# Patient Record
Sex: MALE | Race: WHITE
[De-identification: names, ages, dates, MRNs, and addresses within clinical notes are randomized per-mention and may not be internally consistent; named-entity substitution may affect disease eponyms.]

---

## 2017-08-31 ENCOUNTER — HOSPITAL ENCOUNTER (EMERGENCY)
Dept: HOSPITAL 11 - JP.ED | Age: 57
Discharge: HOME | End: 2017-08-31
Payer: MEDICAID

## 2017-08-31 VITALS — SYSTOLIC BLOOD PRESSURE: 137 MMHG | DIASTOLIC BLOOD PRESSURE: 92 MMHG

## 2017-08-31 DIAGNOSIS — Z79.82: ICD-10-CM

## 2017-08-31 DIAGNOSIS — Z98.890: ICD-10-CM

## 2017-08-31 DIAGNOSIS — Z79.84: ICD-10-CM

## 2017-08-31 DIAGNOSIS — Z95.1: ICD-10-CM

## 2017-08-31 DIAGNOSIS — Z95.5: ICD-10-CM

## 2017-08-31 DIAGNOSIS — Z96.659: ICD-10-CM

## 2017-08-31 DIAGNOSIS — Z79.899: ICD-10-CM

## 2017-08-31 DIAGNOSIS — Z79.4: ICD-10-CM

## 2017-08-31 DIAGNOSIS — Z98.52: ICD-10-CM

## 2017-08-31 DIAGNOSIS — R07.89: Primary | ICD-10-CM

## 2017-08-31 DIAGNOSIS — Z96.22: ICD-10-CM

## 2017-08-31 DIAGNOSIS — Z88.2: ICD-10-CM

## 2017-08-31 DIAGNOSIS — E11.65: ICD-10-CM

## 2017-08-31 DIAGNOSIS — I25.10: ICD-10-CM

## 2017-08-31 DIAGNOSIS — Z88.5: ICD-10-CM

## 2017-08-31 PROCEDURE — 99285 EMERGENCY DEPT VISIT HI MDM: CPT

## 2017-08-31 PROCEDURE — 84484 ASSAY OF TROPONIN QUANT: CPT

## 2017-08-31 PROCEDURE — 85025 COMPLETE CBC W/AUTO DIFF WBC: CPT

## 2017-08-31 PROCEDURE — 80048 BASIC METABOLIC PNL TOTAL CA: CPT

## 2017-08-31 PROCEDURE — 36415 COLL VENOUS BLD VENIPUNCTURE: CPT

## 2017-08-31 PROCEDURE — 93005 ELECTROCARDIOGRAM TRACING: CPT

## 2017-08-31 NOTE — EDM.PDOC
ED HPI GENERAL MEDICAL PROBLEM





- General


Chief Complaint: Chest Pain


Stated Complaint: CHEST PAIN DIFFICU;T TIME BREATHING


Time Seen by Provider: 08/31/17 10:00


Source of Information: Reports: Patient


History Limitations: Reports: No Limitations





- History of Present Illness


INITIAL COMMENTS - FREE TEXT/NARRATIVE: 





57-year-old male with previous coronary artery disease, stent placement and 

triple bypass who over the last 2 hours has had intermittent brief chest pains 

lasting 10-12 seconds. No shortness of breath or diaphoresis. He came in "just 

to make sure". 


Onset: Today


Duration: Hour(s): (For the last 2-1/2 hours)


Location: Reports: Chest


Quality: Reports: Sharp


Severity: Mild


Associated Symptoms: Reports: No Other Symptoms


  ** Chest


Pain Score (Numeric/FACES): 7





- Related Data


 Allergies











Allergy/AdvReac Type Severity Reaction Status Date / Time


 


morphine AdvReac  Nausea and Verified 12/08/16 07:56





   Vomiting  


 


Sulfa (Sulfonamide AdvReac  Nausea and Verified 12/08/16 12:34





Antibiotics)   Vomiting  











Home Meds: 


 Home Meds





Aspirin [Halfprin] 81 mg PO DAILY 03/10/15 [History]


Gabapentin [Neurontin] 600 mg PO QID 03/10/15 [History]


Insulin Aspart [NovoLOG] 1 unit SQ TIDMEALS 03/10/15 [History]


Insulin Glarg,Human.Rec.Analog [Lantus] 20 unit SQ BID 03/10/15 [History]


metFORMIN [Glucophage] 1,000 mg PO BIDMEALS 12/07/16 [History]


Acetaminophen/HYDROcodone [Norco 325-5 MG] 1 - 2 tab PO Q4H PRN #50 tablet 12/09 /16 [Rx]











Past Medical History


Cardiovascular History: Reports: Other (See Below)


Other Cardiovascular History: CABG X3 IN 2015


Musculoskeletal History: Reports: Back Pain, Chronic


Psychiatric History: Reports: Bipolar


Endocrine/Metabolic History: Reports: Diabetes, Type II, IDDM





- Infectious Disease History


Infectious Disease History: Reports: Chicken Pox





- Past Surgical History


HEENT Surgical History: Reports: Adenoidectomy, Myringotomy w Tube(s)


Cardiovascular Surgical History: Reports: Coronary Artery Bypass, Coronary 

Artery Stent


Male  Surgical History: Reports: Circumcision, Vasectomy


Neurological Surgical History: Reports: Lumbar Spine


Musculoskeletal Surgical History: Reports: Arthroscopic Knee, Knee Replacement, 

Shoulder Surgery, Other (See Below)





Social & Family History





- Tobacco Use


Smoking Status *Q: Never Smoker


Second Hand Smoke Exposure: No





- Caffeine Use


Caffeine Use: Reports: None





- Alcohol Use


Days Per Week of Alcohol Use: 0





- Recreational Drug Use


Recreational Drug Use: No





ED ROS GENERAL





- Review of Systems


Review Of Systems: See Below


Constitutional: Reports: No Symptoms


HEENT: Reports: No Symptoms


Respiratory: Denies: Shortness of Breath, Pleuritic Chest Pain


Cardiovascular: Reports: Chest Pain.  Denies: Blood Pressure Problem, 

Palpitations


GI/Abdominal: Denies: Abdominal Pain, Nausea, Vomiting


Skin: Denies: Diaphoresis


Neurological: Reports: No Symptoms


Psychiatric: Reports: No Symptoms





ED EXAM, GENERAL





- Physical Exam


Exam: See Below


Exam Limited By: No Limitations


General Appearance: Alert, No Apparent Distress


Eye Exam: Bilateral Eye: Normal Inspection


Head: Atraumatic


Respiratory/Chest: No Respiratory Distress


Cardiovascular: Regular Rate, Rhythm, Extra Beats (Occasional)


GI/Abdominal: Soft, Non-Tender


Extremities: Normal Inspection.  No: Pedal Edema


Neurological: Alert, Oriented


Psychiatric: Normal Affect, Normal Mood


Skin Exam: Warm, Dry





Course





- Vital Signs


Last Recorded V/S: 


 Last Vital Signs











Temp  97.5 F   08/31/17 10:35


 


Pulse  90   08/31/17 10:35


 


Resp  12   08/31/17 10:35


 


BP  137/92 H  08/31/17 10:35


 


Pulse Ox  96   08/31/17 10:35














- Orders/Labs/Meds


Orders: 


 Active Orders 24 hr











 Category Date Time Status


 


 EKG Documentation Completion [RC] ASDIRECTED Care  08/31/17 10:03 Active


 


 EKG 12 Lead [EK] Routine Ther  08/31/17 10:03 Ordered











Labs: 


 Laboratory Tests











  08/31/17 08/31/17 Range/Units





  10:16 10:16 


 


WBC  7.2   (4.5-11.0)  K/uL


 


RBC  5.58   (4.30-5.90)  M/uL


 


Hgb  15.1 H   (12.0-15.0)  g/dL


 


Hct  43.5   (40.0-54.0)  %


 


MCV  78 L   (80-98)  fL


 


MCH  27   (27-31)  pg


 


MCHC  35   (32-36)  %


 


Plt Count  247   (150-400)  K/uL


 


Neut % (Auto)  63   (36-66)  %


 


Lymph % (Auto)  25   (24-44)  %


 


Mono % (Auto)  10 H   (2-6)  %


 


Eos % (Auto)  2   (2-4)  %


 


Baso % (Auto)  0   (0-1)  %


 


Sodium   137 L  (140-148)  mmol/L


 


Potassium   4.2  (3.6-5.2)  mmol/L


 


Chloride   104  (100-108)  mmol/L


 


Carbon Dioxide   27  (21-32)  mmol/L


 


Anion Gap   10.2  (5.0-14.0)  mmol/L


 


BUN   16  (7-18)  mg/dL


 


Creatinine   1.0  (0.8-1.3)  mg/dL


 


Est Cr Clr Drug Dosing   78.85  mL/min


 


Estimated GFR (MDRD)   > 60  (>60)  


 


Glucose   372 H  ()  mg/dL


 


Calcium   8.2 L  (8.5-10.1)  mg/dL


 


Troponin I   < 0.017  (0.000-0.056)  ng/mL











Meds: 


Medications














Discontinued Medications














Generic Name Dose Route Start Last Admin





  Trade Name Raul  PRN Reason Stop Dose Admin


 


Nitroglycerin  0.4 mg  08/31/17 10:28  08/31/17 10:33





  Nitrostat  SL  08/31/17 10:29  0.4 mg





  ONETIME ONE   Administration














- Re-Assessments/Exams


Free Text/Narrative Re-Assessment/Exam: 





08/31/17 10:30


EKG showed no acute findings, sinus rhythm. CBC, BMP and troponin were obtained

, a sublingual nitroglycerin was given to see if it would relieve esophageal 

spasm which is more typical of the types of pain he is experiencing. His vital 

signs remained stable.


08/31/17 10:51


Nitroglycerin had no effect, he was still experiencing very brief discomfort 

every 2-3 minutes for about 10 seconds. I offered a GI cocktail but when his 

troponin came back negative and he was reassured it wasn't cardiac pain he 

wanted to just give it a day or 2 to see if it will go away.





Departure





- Departure


Time of Disposition: 11:10


Disposition: Home, Self-Care 01


Condition: Good


Clinical Impression: 


 Atypical chest pain, Hyperglycemia





Coronary artery disease


Qualifiers:


 Coronary Disease-Associated Artery/Lesion type: unspecified vessel or lesion 

type Native vs. transplanted heart: native heart Associated angina: without 

angina Qualified Code(s): I25.10 - Atherosclerotic heart disease of native 

coronary artery without angina pectoris








- Discharge Information


Instructions:  Nonspecific Chest Pain, Easy-to-Read


Referrals: 


PCP,None [Primary Care Provider] - 


Forms:  ED Department Discharge


Care Plan Goals: 


Continue your regular medications and increase activity as tolerated. Return 

any time if pain worsens or you develop other concerns.





- My Orders


Last 24 Hours: 


My Active Orders





08/31/17 10:03


EKG Documentation Completion [RC] ASDIRECTED 


EKG 12 Lead [EK] Routine 














- Assessment/Plan


Last 24 Hours: 


My Active Orders





08/31/17 10:03


EKG Documentation Completion [RC] ASDIRECTED 


EKG 12 Lead [EK] Routine

## 2017-10-25 ENCOUNTER — HOSPITAL ENCOUNTER (EMERGENCY)
Dept: HOSPITAL 11 - JP.ED | Age: 57
Discharge: NURSING, REHAB OR LONG-TERM CARE | End: 2017-10-25
Payer: MEDICAID

## 2017-10-25 VITALS — SYSTOLIC BLOOD PRESSURE: 129 MMHG | DIASTOLIC BLOOD PRESSURE: 97 MMHG

## 2017-10-25 DIAGNOSIS — Z88.5: ICD-10-CM

## 2017-10-25 DIAGNOSIS — Z79.82: ICD-10-CM

## 2017-10-25 DIAGNOSIS — E11.9: ICD-10-CM

## 2017-10-25 DIAGNOSIS — I21.4: Primary | ICD-10-CM

## 2017-10-25 DIAGNOSIS — Z88.2: ICD-10-CM

## 2017-10-25 DIAGNOSIS — Z79.899: ICD-10-CM

## 2017-10-25 DIAGNOSIS — Z79.4: ICD-10-CM

## 2017-10-25 PROCEDURE — 99285 EMERGENCY DEPT VISIT HI MDM: CPT

## 2017-10-25 PROCEDURE — 96375 TX/PRO/DX INJ NEW DRUG ADDON: CPT

## 2017-10-25 PROCEDURE — 93005 ELECTROCARDIOGRAM TRACING: CPT

## 2017-10-25 PROCEDURE — 96374 THER/PROPH/DIAG INJ IV PUSH: CPT

## 2017-10-25 PROCEDURE — 84484 ASSAY OF TROPONIN QUANT: CPT

## 2017-10-25 PROCEDURE — 82553 CREATINE MB FRACTION: CPT

## 2017-10-25 PROCEDURE — 71010: CPT

## 2017-10-25 PROCEDURE — 85730 THROMBOPLASTIN TIME PARTIAL: CPT

## 2017-10-25 PROCEDURE — 85025 COMPLETE CBC W/AUTO DIFF WBC: CPT

## 2017-10-25 PROCEDURE — 80053 COMPREHEN METABOLIC PANEL: CPT

## 2017-10-25 PROCEDURE — 82962 GLUCOSE BLOOD TEST: CPT

## 2017-10-25 PROCEDURE — 85610 PROTHROMBIN TIME: CPT

## 2017-10-25 PROCEDURE — 96376 TX/PRO/DX INJ SAME DRUG ADON: CPT

## 2017-10-25 PROCEDURE — 36415 COLL VENOUS BLD VENIPUNCTURE: CPT

## 2017-10-25 NOTE — EDM.PDOC
ED HPI GENERAL MEDICAL PROBLEM





- General


Chief Complaint: Chest Pain


Stated Complaint: CHEST PAIN


Time Seen by Provider: 10/25/17 12:58


Source of Information: Reports: Patient, Family, RN Notes Reviewed


History Limitations: Reports: No Limitations





- History of Present Illness


INITIAL COMMENTS - FREE TEXT/NARRATIVE: 





57-year-old gentleman presents to the emergency department day complaint of 

chest pain, he has a known history of coronary artery disease states the chest 

pain started 45 minutes prior it feels worse than his prior heart attack he is 

nauseated the pain is radiating down his left arm and he does feel short of 

breath he is not diaphoretic


  ** Chest


Pain Score (Numeric/FACES): 3





- Related Data


 Allergies











Allergy/AdvReac Type Severity Reaction Status Date / Time


 


morphine AdvReac  Nausea and Verified 10/25/17 14:38





   Vomiting  


 


Sulfa (Sulfonamide AdvReac  Nausea and Verified 10/25/17 14:38





Antibiotics)   Vomiting  











Home Meds: 


 Home Meds





Aspirin [Halfprin] 81 mg PO DAILY 03/10/15 [History]


Gabapentin [Neurontin] 600 mg PO QID 03/10/15 [History]


Insulin Aspart [NovoLOG] 1 unit SQ TIDMEALS 03/10/15 [History]


Insulin Glarg,Human.Rec.Analog [Lantus] 30 unit SQ BID 03/10/15 [History]


metFORMIN [Glucophage] 1,000 mg PO BIDMEALS 12/07/16 [History]











Past Medical History


Cardiovascular History: Reports: CAD, High Cholesterol, Hypertension, MI, Other 

(See Below)


Other Cardiovascular History: CABG X3 IN 2015


Musculoskeletal History: Reports: Back Pain, Chronic


Psychiatric History: Reports: Bipolar


Endocrine/Metabolic History: Reports: Diabetes, Type II, IDDM





- Infectious Disease History


Infectious Disease History: Reports: Chicken Pox, Mumps





- Past Surgical History


HEENT Surgical History: Reports: Adenoidectomy, Myringotomy w Tube(s)


Cardiovascular Surgical History: Reports: Coronary Artery Bypass, Coronary 

Artery Stent


GI Surgical History: Reports: Cholecystectomy


Male  Surgical History: Reports: Circumcision, Vasectomy


Neurological Surgical History: Reports: Lumbar Spine


Musculoskeletal Surgical History: Reports: Arthroscopic Knee, Knee Replacement, 

Shoulder Surgery, Other (See Below)


Other Musculoskeletal Surgeries/Procedures:: back surgery, multiple  left leg 

surgery, left and right shoulder surgerys





Social & Family History





- Family History


Cardiac: Reports: Heart Failure, MI


Neurological: Reports: None


Oncologic: Reports: Pancreatic





- Tobacco Use


Smoking Status *Q: Never Smoker


Second Hand Smoke Exposure: No





- Caffeine Use


Caffeine Use: Reports: Soda, Tea


Other Caffeine Use: diet





- Alcohol Use


Days Per Week of Alcohol Use: 0





- Recreational Drug Use


Recreational Drug Use: No





ED ROS GENERAL





- Review of Systems


Review Of Systems: See Below


Constitutional: Reports: No Symptoms


HEENT: Reports: No Symptoms


Respiratory: Reports: Shortness of Breath


Cardiovascular: Reports: Chest Pain


GI/Abdominal: Reports: Nausea.  Denies: Vomiting


: Reports: No Symptoms


Musculoskeletal: Reports: No Symptoms


Skin: Reports: No Symptoms


Neurological: Reports: No Symptoms





ED EXAM, GENERAL





- Physical Exam


Exam: See Below


Exam Limited By: No Limitations


General Appearance: Alert, Moderate Distress


Head: Atraumatic, Normocephalic


Neck: Normal Inspection, Supple, Non-Tender, Full Range of Motion


Respiratory/Chest: No Respiratory Distress, Lungs Clear, Normal Breath Sounds, 

No Accessory Muscle Use


Cardiovascular: No Murmur, Tachycardia


GI/Abdominal: Soft, Non-Tender





Course





- Vital Signs


Last Recorded V/S: 


 Last Vital Signs











Temp  99.0 F   10/25/17 13:33


 


Pulse  111 H  10/25/17 13:33


 


Resp  16   10/25/17 15:44


 


BP  129/80   10/25/17 15:44


 


Pulse Ox  99   10/25/17 15:44














- Orders/Labs/Meds


Orders: 


 Active Orders 24 hr











 Category Date Time Status


 


 Cardiac Monitoring [RC] .As Directed Care  10/25/17 13:02 Active


 


 EKG Documentation Completion [RC] ASDIRECTED Care  10/25/17 13:03 Active


 


 Peripheral IV Care [RC] .AS DIRECTED Care  10/25/17 13:03 Active


 


 Peripheral IV Care [RC] .AS DIRECTED Care  10/25/17 16:04 Ordered


 


 GLUCOSE POC LAB TO COLLECT [POC] Stat Lab  10/25/17 17:00 Ordered


 


 Heparin Sodium/D5W [Heparin 25,000 Units in D5W 500 ML] Med  10/25/17 16:15 

Ordered





 25,000 units in 500 ml IV TITRATE   


 


 Metoprolol Tartrate [Lopressor] Med  10/25/17 16:18 Once





 25 mg PO ONETIME ONE   


 


 Nitroglycerin/D5W [Nitroglycerin  25 MG/D5W 250 ML] Med  10/25/17 15:00 Active





 25 mg in 250 ml IV TITRATE   


 


 Sodium Chloride 0.9% [Saline Flush] Med  10/25/17 13:02 Active





 10 ml FLUSH ASDIRECTED PRN   


 


 Sodium Chloride 0.9% [Saline Flush] Med  10/25/17 16:03 Ordered





 10 ml FLUSH ASDIRECTED PRN   


 


 Peripheral IV Insertion Adult [OM.PC] Stat Oth  10/25/17 13:02 Ordered


 


 Peripheral IV Insertion Adult [OM.PC] Stat Oth  10/25/17 16:03 Ordered


 


 Saline Lock Insert [OM.PC] Stat Oth  10/25/17 13:02 Ordered


 


 EKG 12 Lead [EK] Stat Ther  10/25/17 13:03 Ordered








 Medication Orders





Nitroglycerin/Dextrose (Nitroglycerin  25 Mg/D5w 250 Ml)  25 mg in 250 mls @ 

1.2 mls/hr IV TITRATE JOAN; 2 MCG/MIN


   PRN Reason: Protocol


   Last Admin: 10/25/17 15:41  Dose: 2 mcg/min, 1.2 mls/hr


Heparin Sodium/Dextrose (Heparin 25,000 Units In D5w 500 Ml)  25,000 units in 

500 mls @ 0 mls/hr IV TITRATE JOAN; 12 UNITS/KG/HR


   PRN Reason: Protocol


Sodium Chloride (Saline Flush)  10 ml FLUSH ASDIRECTED PRN


   PRN Reason: Keep Vein Open


   Last Admin: 10/25/17 14:11  Dose: 10 ml


Sodium Chloride (Saline Flush)  10 ml FLUSH ASDIRECTED PRN


   PRN Reason: Keep Vein Open








Labs: 


 Laboratory Tests











  10/25/17 10/25/17 10/25/17 Range/Units





  13:12 13:12 13:12 


 


WBC  11.5 H    (4.5-11.0)  K/uL


 


RBC  6.02 H    (4.30-5.90)  M/uL


 


Hgb  16.4 H    (12.0-15.0)  g/dL


 


Hct  46.4    (40.0-54.0)  %


 


MCV  77 L    (80-98)  fL


 


MCH  27    (27-31)  pg


 


MCHC  35    (32-36)  %


 


Plt Count  299    (150-400)  K/uL


 


Neut % (Auto)  76 H    (36-66)  %


 


Lymph % (Auto)  17 L    (24-44)  %


 


Mono % (Auto)  6    (2-6)  %


 


Eos % (Auto)  1 L    (2-4)  %


 


Baso % (Auto)  0    (0-1)  %


 


PT   11.2   (9.5-12.0)  sec


 


INR   1.04   (0.80-1.20)  


 


APTT   25.4 L   (27.0-36.0)  sec


 


Sodium    135 L  (140-148)  mmol/L


 


Potassium    3.8  (3.6-5.2)  mmol/L


 


Chloride    103  (100-108)  mmol/L


 


Carbon Dioxide    20 L  (21-32)  mmol/L


 


Anion Gap    15.8 H  (5.0-14.0)  mmol/L


 


BUN    17  (7-18)  mg/dL


 


Creatinine    1.2  (0.8-1.3)  mg/dL


 


Est Cr Clr Drug Dosing    65.71  mL/min


 


Estimated GFR (MDRD)    > 60  (>60)  


 


Glucose    483 H*  ()  mg/dL


 


Calcium    9.5  D  (8.5-10.1)  mg/dL


 


Total Bilirubin    0.9  D  (0.2-1.0)  mg/dL


 


AST    86 H  (15-37)  U/L


 


ALT    134 H  (12-78)  U/L


 


Alkaline Phosphatase    153 H  ()  U/L


 


CK-MB (CK-2)    2.2  (0-3.6)  mg/mL


 


Troponin I    0.031  (0.000-0.056)  ng/mL


 


Total Protein    7.1  (6.4-8.2)  g/dL


 


Albumin    3.3 L  (3.4-5.0)  g/dL


 


Globulin    3.8 H  (2.3-3.5)  g/dL


 


Albumin/Globulin Ratio    0.9 L  (1.2-2.2)  














  10/25/17 Range/Units





  15:20 


 


WBC   (4.5-11.0)  K/uL


 


RBC   (4.30-5.90)  M/uL


 


Hgb   (12.0-15.0)  g/dL


 


Hct   (40.0-54.0)  %


 


MCV   (80-98)  fL


 


MCH   (27-31)  pg


 


MCHC   (32-36)  %


 


Plt Count   (150-400)  K/uL


 


Neut % (Auto)   (36-66)  %


 


Lymph % (Auto)   (24-44)  %


 


Mono % (Auto)   (2-6)  %


 


Eos % (Auto)   (2-4)  %


 


Baso % (Auto)   (0-1)  %


 


PT   (9.5-12.0)  sec


 


INR   (0.80-1.20)  


 


APTT   (27.0-36.0)  sec


 


Sodium   (140-148)  mmol/L


 


Potassium   (3.6-5.2)  mmol/L


 


Chloride   (100-108)  mmol/L


 


Carbon Dioxide   (21-32)  mmol/L


 


Anion Gap   (5.0-14.0)  mmol/L


 


BUN   (7-18)  mg/dL


 


Creatinine   (0.8-1.3)  mg/dL


 


Est Cr Clr Drug Dosing   mL/min


 


Estimated GFR (MDRD)   (>60)  


 


Glucose   ()  mg/dL


 


Calcium   (8.5-10.1)  mg/dL


 


Total Bilirubin   (0.2-1.0)  mg/dL


 


AST   (15-37)  U/L


 


ALT   (12-78)  U/L


 


Alkaline Phosphatase   ()  U/L


 


CK-MB (CK-2)   (0-3.6)  mg/mL


 


Troponin I  1.449 H*  (0.000-0.056)  ng/mL


 


Total Protein   (6.4-8.2)  g/dL


 


Albumin   (3.4-5.0)  g/dL


 


Globulin   (2.3-3.5)  g/dL


 


Albumin/Globulin Ratio   (1.2-2.2)  











Meds: 


Medications











Generic Name Dose Route Start Last Admin





  Trade Name Freq  PRN Reason Stop Dose Admin


 


Nitroglycerin/Dextrose  25 mg in 250 mls @ 1.2 mls/hr  10/25/17 15:00  10/25/17 

15:41





  Nitroglycerin  25 Mg/D5w 250 Ml  IV   2 mcg/min





  TITRATE JOAN   1.2 mls/hr





  Protocol   Administration





  2 MCG/MIN   


 


Heparin Sodium/Dextrose  25,000 units in 500 mls @ 0 mls/hr  10/25/17 16:15  





  Heparin 25,000 Units In D5w 500 Ml  IV   





  TITRATE JOAN   





  Protocol   





  12 UNITS/KG/HR   


 


Sodium Chloride  10 ml  10/25/17 13:02  10/25/17 14:11





  Saline Flush  FLUSH   10 ml





  ASDIRECTED PRN   Administration





  Keep Vein Open   


 


Sodium Chloride  10 ml  10/25/17 16:03  





  Saline Flush  FLUSH   





  ASDIRECTED PRN   





  Keep Vein Open   














Discontinued Medications














Generic Name Dose Route Start Last Admin





  Trade Name Rafaelq  PRN Reason Stop Dose Admin


 


Aspirin  324 mg  10/25/17 13:02  10/25/17 13:21





  Aspirin  PO  10/25/17 13:03  324 mg





  ONETIME ONE   Administration


 


Clopidogrel Bisulfate  300 mg  10/25/17 16:03  





  Plavix  PO  10/25/17 16:04  





  ONETIME ONE   


 


Fentanyl  100 mcg  10/25/17 13:03  10/25/17 13:21





  Sublimaze  IVPUSH  10/25/17 13:04  100 mcg





  ONETIME ONE   Administration


 


Fentanyl  50 mcg  10/25/17 13:55  10/25/17 14:07





  Sublimaze  IVPUSH  10/25/17 13:56  50 mcg





  ONETIME ONE   Administration


 


Heparin Sodium (Porcine)  4,000 units  10/25/17 16:03  





  Heparin Sodium  IVPUSH  10/25/17 16:04  





  .BOLUS ONE   


 


Insulin Aspart  10 unit  10/25/17 14:35  





  Novolog  SUBCUT  10/25/17 14:36  





  NOW STA   


 


Insulin Aspart  30 unit  10/25/17 15:49  





  Novolog  SUBCUT  10/25/17 15:50  





  NOW STA   


 


Insulin Aspart  Confirm  10/25/17 16:15  





  Novolog  Administered  10/25/17 16:16  





  Dose   





  1,000 unit   





  .ROUTE   





  .STK-MED ONE   


 


Nitroglycerin  0.4 mg  10/25/17 14:25  10/25/17 14:30





  Nitrostat  SL  10/25/17 14:26  0.4 mg





  ONETIME ONE   Administration


 


Ondansetron HCl  4 mg  10/25/17 13:03  10/25/17 13:23





  Zofran  IVPUSH  10/25/17 13:04  4 mg





  ONETIME ONE   Administration














Departure





- Departure


Time of Disposition: 16:22


Disposition: DC/[facility] to [facility]


Reason for Transfer *Q: Primary PCI Indicated


Condition: Fair


Clinical Impression: 


 Non-STEMI (non-ST elevated myocardial infarction)





Referrals: 


Jani Crandall MD [Primary Care Provider] - 


Forms:  ED Department Discharge





- My Orders


Last 24 Hours: 


My Active Orders





10/25/17 13:02


Cardiac Monitoring [RC] .As Directed 


Sodium Chloride 0.9% [Saline Flush]   10 ml FLUSH ASDIRECTED PRN 


Peripheral IV Insertion Adult [OM.PC] Stat 


Saline Lock Insert [OM.PC] Stat 





10/25/17 13:03


EKG Documentation Completion [RC] ASDIRECTED 


Peripheral IV Care [RC] .AS DIRECTED 


EKG 12 Lead [EK] Stat 





10/25/17 15:00


Nitroglycerin/D5W [Nitroglycerin  25 MG/D5W 250 ML] 25 mg in 250 ml IV TITRATE 





10/25/17 16:03


Sodium Chloride 0.9% [Saline Flush]   10 ml FLUSH ASDIRECTED PRN 


Peripheral IV Insertion Adult [OM.PC] Stat 





10/25/17 16:04


Peripheral IV Care [RC] .AS DIRECTED 





10/25/17 16:15


Heparin Sodium/D5W [Heparin 25,000 Units in D5W 500 ML] 25,000 units in 500 ml 

IV TITRATE 





10/25/17 16:18


Metoprolol Tartrate [Lopressor]   25 mg PO ONETIME ONE 





10/25/17 17:00


GLUCOSE POC LAB TO COLLECT [POC] Stat 














- Assessment/Plan


Last 24 Hours: 


My Active Orders





10/25/17 13:02


Cardiac Monitoring [RC] .As Directed 


Sodium Chloride 0.9% [Saline Flush]   10 ml FLUSH ASDIRECTED PRN 


Peripheral IV Insertion Adult [OM.PC] Stat 


Saline Lock Insert [OM.PC] Stat 





10/25/17 13:03


EKG Documentation Completion [RC] ASDIRECTED 


Peripheral IV Care [RC] .AS DIRECTED 


EKG 12 Lead [EK] Stat 





10/25/17 15:00


Nitroglycerin/D5W [Nitroglycerin  25 MG/D5W 250 ML] 25 mg in 250 ml IV TITRATE 





10/25/17 16:03


Sodium Chloride 0.9% [Saline Flush]   10 ml FLUSH ASDIRECTED PRN 


Peripheral IV Insertion Adult [OM.PC] Stat 





10/25/17 16:04


Peripheral IV Care [RC] .AS DIRECTED 





10/25/17 16:15


Heparin Sodium/D5W [Heparin 25,000 Units in D5W 500 ML] 25,000 units in 500 ml 

IV TITRATE 





10/25/17 16:18


Metoprolol Tartrate [Lopressor]   25 mg PO ONETIME ONE 





10/25/17 17:00


GLUCOSE POC LAB TO COLLECT [POC] Stat 











Plan: 





Assessment





Acuity = acute





Site and laterality = non-ST elevation myocardial infarction complicated 

patient with known history coronary artery disease, hypertension, dyslipidemia 

and diabetes mellitus type 2    





Etiology  = probably related coronary artery disease





Manifestations = chest pain no stable





Location of injury =  Home





Lab values = WBC elevated 11.5 consistent with leukocytosis, hemoglobin 

elevated 16.9 sodium low at 135 consistent hyponatremia glucose elevated at 483 

consistent with hyperglycemia AST elevated 86 ALTs elevated 153 consistent with 

elevated liver enzymes initial troponin was 0.0312 hours later 1.449 albumin 

low at 3.3 consistent hypoalbuminemia EKG demonstrates sinus rhythm I don't 

appreciate any ST elevations or depressions, chest x-ray shows no acute process





Plan


Called and discussed case with Dr. Whiteside hospitalist on call at [facility] kindly accept the patient in transport he will be transported via 

EMS ground he has received aspirin, 3 sprays of nitroglycerin followed by a 

nitro drip 150 g of fentanyl he is pain-free at this time 25 mg of metoprolol 

300 mg of Plavix 4000 and unit bolus of heparin and initiated heparin drip upon 

discharge from the ED

















Patient was in agreement with the plan all questions were answered, This note 

was dictated using dragon voice recognition software please call with any 

questions.

## 2017-10-25 NOTE — CR
Chest 1V Frontal

 

FINDINGS: There is elevation of the right hemidiaphragm. Intact sternal wires are demonstrated. The h
eart and vascular structures are normal in appearance. No infiltrates or effusions are demonstrated. 
The skeletal structures are unremarkable.

 

IMPRESSION:

1. No acute findings.

## 2018-04-17 ENCOUNTER — HOSPITAL ENCOUNTER (INPATIENT)
Dept: HOSPITAL 11 - JP.SDSSCHI | Age: 58
LOS: 2 days | Discharge: HOME | DRG: 460 | End: 2018-04-19
Attending: ORTHOPAEDIC SURGERY | Admitting: ORTHOPAEDIC SURGERY
Payer: MEDICAID

## 2018-04-17 DIAGNOSIS — M99.83: ICD-10-CM

## 2018-04-17 DIAGNOSIS — Z79.82: ICD-10-CM

## 2018-04-17 DIAGNOSIS — Z79.01: ICD-10-CM

## 2018-04-17 DIAGNOSIS — M51.26: Primary | ICD-10-CM

## 2018-04-17 DIAGNOSIS — Z95.5: ICD-10-CM

## 2018-04-17 DIAGNOSIS — Z88.5: ICD-10-CM

## 2018-04-17 DIAGNOSIS — I25.2: ICD-10-CM

## 2018-04-17 DIAGNOSIS — Z79.4: ICD-10-CM

## 2018-04-17 DIAGNOSIS — E11.9: ICD-10-CM

## 2018-04-17 DIAGNOSIS — I25.10: ICD-10-CM

## 2018-04-17 DIAGNOSIS — M48.061: ICD-10-CM

## 2018-04-17 DIAGNOSIS — Z88.2: ICD-10-CM

## 2018-04-17 DIAGNOSIS — G89.29: ICD-10-CM

## 2018-04-17 DIAGNOSIS — Z95.1: ICD-10-CM

## 2018-04-17 DIAGNOSIS — M54.9: ICD-10-CM

## 2018-04-17 DIAGNOSIS — Z96.659: ICD-10-CM

## 2018-04-17 DIAGNOSIS — I10: ICD-10-CM

## 2018-04-17 PROCEDURE — C1713 ANCHOR/SCREW BN/BN,TIS/BN: HCPCS

## 2018-04-17 PROCEDURE — C9113 INJ PANTOPRAZOLE SODIUM, VIA: HCPCS

## 2018-04-17 PROCEDURE — 0ST20ZZ RESECTION OF LUMBAR VERTEBRAL DISC, OPEN APPROACH: ICD-10-PCS | Performed by: ORTHOPAEDIC SURGERY

## 2018-04-17 PROCEDURE — 0SG10A0 FUSION OF 2 OR MORE LUMBAR VERTEBRAL JOINTS WITH INTERBODY FUSION DEVICE, ANTERIOR APPROACH, ANTERIOR COLUMN, OPEN APPROACH: ICD-10-PCS | Performed by: ORTHOPAEDIC SURGERY

## 2018-04-17 RX ADMIN — INSULIN DETEMIR SCH UNITS: 100 INJECTION, SOLUTION SUBCUTANEOUS at 21:51

## 2018-04-17 RX ADMIN — Medication SCH: at 18:42

## 2018-04-17 NOTE — OR
DATE OF PROCEDURE:  04/17/2018

 

PREOPERATIVE DIAGNOSIS:  Lumbar stenosis, L4-L5 and L5-S1.

 

POSTOPERATIVE DIAGNOSIS:  Lumbar stenosis, L4-L5 and L5-S1.

 

PROCEDURE:

1. Anterior lumbar interbody fusion, L4-L5 and L5-S1.

2. Interbody placement at L4-L5 and L5-S1.

3. Segmental instrumentation anteriorly at L4-L5 and L5-S1.

 

CO-SURGEON:  Henrry Suazo MD.

 

ANESTHESIA:  General endotracheal intubation.

 

FLUIDS:  Lactated Ringer's solution.

 

ESTIMATED BLOOD LOSS:  250 mL.

 

COMPLICATIONS:  None.

 

SPECIMEN:  None.

 

DISCHARGE DISPOSITION:  Stable to PACU.

 

HISTORY AND INDICATIONS FOR THE PROCEDURE:  The patient was seen preoperatively in the

clinic.  He failed nonoperative treatment.  Preoperative imaging confirmed the above-

mentioned diagnosis.  Risks and benefits of the procedure were explained to the patient.

Informed consent was obtained.

 

DETAILS OF PROCEDURE:  The patient was seen preoperatively by myself and the Anesthesia

staff in the preoperative holding area, where the operative site was marked.  He was brought

to the operative suite by Anesthesia staff where general anesthesia was administered,

neuromonitoring leads were placed, and normal at baseline.  Sterile Sweeney catheter was

inserted.  The fluoroscopy unit was draped in a sterile manner.  A time-out was called

identifying the correct patient, the correct procedure, the correct site, and antibiotics

were begun within appropriate period of time.  Please see Dr. Henrry Suazo's note for

exposure.

 

After exposure was completed at L5-S1, I then went through the anterior annulus with a Bovie

electrocautery and a deep 15 blade.  I then used a Vann to go along the vertebral endplates

and then removed as much as I could of the disk on block.  I then used curettes,

pituitaries, and Kerrisons to remove the remainder of the disk.  I then sequentially

inserted carlie from 9 mm to 15 mm.  I then inserted a 25 x 31, 15-degree, 15 mm trial and

evaluated this under fluoroscopy.  I did make some minor adjustments to make sure that I

could get midline enough.  I then removed this trial.  I then packed the final implant,

which was a magnify-S from [employer], 25 x 31, 15-degree, 14-17 mm implant.  Then, I had packed

it with Signify allograft from [employer].  We then inserted and then I expanded it under direct

fluoroscopic visualization.  I then awl'd my awl, tapped in place my 30 mm screws, all 3

were 30 mm, hydroxyapatite coated, 5.5 x 30 mm screws.  Dr. Henrry Suazo then exposed the L4-

L5 level and then I repeated the same procedure with the same size trial and the same size

implant with two 30 mm screws superiorly and on the right and then a 25 mm screw on the

left.  I was unable to place a 30 mm screw on the left because it would not go down far

enough.  I then locked all of my screws into place on both implants.  We took final films.

Please see Dr. Henrry Suazo's note for closure.

 

 

 

 

Tahir Suazo DO

DD:  04/17/2018 16:34:55

DT:  04/17/2018 17:16:59

Job #:  [id number]

## 2018-04-18 RX ADMIN — Medication SCH: at 08:28

## 2018-04-18 RX ADMIN — INSULIN DETEMIR SCH UNITS: 100 INJECTION, SOLUTION SUBCUTANEOUS at 21:29

## 2018-04-18 RX ADMIN — INSULIN DETEMIR SCH UNITS: 100 INJECTION, SOLUTION SUBCUTANEOUS at 08:31

## 2018-04-18 NOTE — PCM.SURGPN
- General Info


Date of Service: 04/18/18


Date of Surgery/Procedure: 04/17/18


POD#: 1


Post-Op Diagnosis: Lumbar stenosis L4-L5 L5-S1


Functional Status: Reports: Pain Controlled, Ambulating, Urinating





- Review of Systems


General: Reports: No Symptoms


HEENT: Reports: No Symptoms


Pulmonary: Reports: No Symptoms


Cardiovascular: Reports: No Symptoms


Gastrointestinal: Reports: Abdominal Pain (pain at incision site)


Genitourinary: Reports: No Symptoms


Musculoskeletal: Reports: No Symptoms


Systems Review Comment:: 





Pt states he is doing very well, no back pain although he does have some pain 

at the incision site on abdomen. Pt states he is hungry and would like to eat.





- Patient Data


Vitals - Most Recent: 


 Last Vital Signs











Temp  36.4 C   04/18/18 14:14


 


Pulse  99   04/18/18 14:14


 


Resp  18   04/18/18 14:14


 


BP  141/73 H  04/18/18 14:14


 


Pulse Ox  97   04/18/18 14:14











Weight - Most Recent: 83.915 kg


I&O - Last 24 Hours: 


 Intake & Output











 04/18/18 04/18/18 04/18/18





 06:59 14:59 22:59


 


Intake Total 1712 470 


 


Output Total 1150 350 


 


Balance 562 120 











Med Orders - Current: 


 Current Medications





Aspirin (Halfprin)  81 mg PO DAILY Count includes the [facility]


   Last Admin: 04/18/18 08:27 Dose:  81 mg


Cyclobenzaprine HCl (Flexeril)  10 mg PO Q6H PRN


   PRN Reason: MUSCLE SPASM


Cyclobenzaprine HCl (Flexeril)  10 mg PO TID PRN


   PRN Reason: Muscle Spasm


Dextrose (Glutose 15)  15 gm PO ASDIRECTED PRN


   PRN Reason: HYPOGLYCEMIA


Dextrose/Water (Dextrose 50% In Water)  50 ml IVPUSH ASDIRECTED PRN


   PRN Reason: HYPOGLYCEMIA


Docusate Sodium (Colace)  100 mg PO BID Count includes the [facility]


   Last Admin: 04/18/18 08:27 Dose:  100 mg


Gabapentin (Neurontin)  600 mg PO QID Count includes the [facility]


   Last Admin: 04/18/18 10:25 Dose:  Not Given


Glucagon (Glucagen)  1 mg IM ASDIRECTED PRN


   PRN Reason: HYPOGLYCEMIA


Hydromorphone HCl (Dilaudid Pca 15 Mg In Ns 30 Ml)  0 mg IV ASDIRECTED PRN; 

Protocol


   PRN Reason: Pain


   Last Admin: 04/17/18 10:43 Dose:  0.3 mg


Hydroxyzine HCl (Vistaril)  100 mg IM Q4H PRN


   PRN Reason: PAIN


Hydroxyzine HCl (Atarax)  100 mg PO Q4H PRN


   PRN Reason: PAIN


Dextrose/Lactated Ringer's (Dextrose 5%-Lactated Ringers)  1,000 mls @ 150 mls/

hr IV ASDIRECTED Count includes the [facility]


   Last Admin: 04/18/18 12:38 Dose:  150 mls/hr


Insulin Aspart (Novolog)  0 unit SUBCUT QID PRN; Protocol


   PRN Reason: LOW CORRECTIONAL DOSE


   Last Admin: 04/18/18 11:58 Dose:  4 units


Insulin Detemir (Levemir)  20 unit SUBCUT BID Count includes the [facility]


   Last Admin: 04/18/18 08:31 Dose:  20 units


Lisinopril (Prinivil)  2.5 mg PO DAILY Count includes the [facility]


   Last Admin: 04/18/18 08:34 Dose:  2.5 mg


Naloxone HCl (Narcan)  0.1 mg IV ASDIRECTED PRN


   PRN Reason: decreased respiratory rate


Scopolamine Patch (Check)  1 each TOP DAILY Count includes the [facility]


   Last Admin: 04/18/18 08:28 Dose:  Not Given


Ondansetron HCl (Zofran)  4 mg IVPUSH Q4H PRN


   PRN Reason: NAUSEA


   Last Admin: 04/17/18 23:22 Dose:  4 mg


Oxycodone/Acetaminophen (Percocet 325-5 Mg)  1 - 2 tab PO Q4H PRN


   PRN Reason: paiin


Pantoprazole Sodium (Protonix Iv***)  40 mg IV Q24H Count includes the [facility]


   Last Admin: 04/17/18 18:48 Dose:  40 mg


Scopolamine (Transderm-Scop)  1.5 mg TOP Q72H Count includes the [facility]


   Last Admin: 04/17/18 09:06 Dose:  1.5 mg


Ticagrelor (Brilinta)  90 mg PO BID Count includes the [facility]


   Last Admin: 04/18/18 08:27 Dose:  90 mg





Discontinued Medications





Acetaminophen (Tylenol Extra Strength)  1,000 mg PO ONETIME ONE


   Stop: 04/17/18 08:31


   Last Admin: 04/17/18 09:06 Dose:  1,000 mg


Acetaminophen (Tylenol Extra Strength)  1,000 mg PO Q6H Count includes the [facility]


   Last Admin: 04/18/18 05:54 Dose:  1,000 mg


Aspirin (Halfprin)  81 mg PO DAILY Count includes the [facility]


   Last Admin: 04/18/18 08:28 Dose:  Not Given


Cyclobenzaprine HCl (Flexeril)  10 mg PO Q8H PRN


   PRN Reason: MUSCLE SPASM


Dexamethasone (Dexamethasone) Confirm Administered Dose 4 mg .ROUTE .STK-MED ONE


   Stop: 04/17/18 09:16


Fentanyl (Sublimaze) Confirm Administered Dose 250 mcg .ROUTE .STK-MED ONE


   Stop: 04/17/18 09:16


Fentanyl (Sublimaze) Confirm Administered Dose 250 mcg .ROUTE .STK-MED ONE


   Stop: 04/17/18 13:28


Fentanyl (Sublimaze) Confirm Administered Dose 250 mcg .ROUTE .STK-MED ONE


   Stop: 04/17/18 13:46


Fentanyl (Sublimaze) Confirm Administered Dose 250 mcg .ROUTE .STK-MED ONE


   Stop: 04/17/18 14:02


Fentanyl (Sublimaze) Confirm Administered Dose 250 mcg .ROUTE .STK-MED ONE


   Stop: 04/17/18 15:38


Gabapentin (Neurontin)  300 mg PO ONETIME ONE


   Stop: 04/17/18 08:31


   Last Admin: 04/17/18 09:07 Dose:  300 mg


Gabapentin (Neurontin)  300 mg PO TID Count includes the [facility]


   Last Admin: 04/17/18 21:51 Dose:  300 mg


Hydroxyzine HCl (Vistaril)  100 mg IM ONETIME ONE


   Stop: 04/17/18 16:31


   Last Admin: 04/17/18 17:31 Dose:  100 mg


Cefazolin Sodium/Dextrose 2 gm (/ Premix)  50 mls @ 100 mls/hr IV ONETIME ONE


   Stop: 04/17/18 09:29


   Last Admin: 04/17/18 12:59 Dose:  100 mls/hr


Lactated Ringer's (Ringers, Lactated)  1,000 mls @ 100 mls/hr IV ASDIRECTED Count includes the [facility]


   Last Admin: 04/17/18 09:07 Dose:  100 mls/hr


Tranexamic Acid 900 mg/ Sodium (Chloride)  59 mls @ 236 mls/hr IV Q3H Count includes the [facility]


   Stop: 04/17/18 13:14


   Last Admin: 04/17/18 17:27 Dose:  236 mls/hr


Ketamine HCl 100 mg/ Sodium (Chloride)  100 mls @ 20.58 mls/hr IV ASDIRECTED Count includes the [facility]


Linezolid (Zyvox) Confirm Administered Dose 100 mls @ as directed .ROUTE .STK-

MED ONE


   Stop: 04/17/18 10:25


Lactated Ringer's (Ringers, Lactated) Confirm Administered Dose 1,000 mls @ as 

directed .ROUTE .STK-MED ONE


   Stop: 04/17/18 16:24


Cefazolin Sodium/Dextrose 2 gm (/ Premix)  50 mls @ 100 mls/hr IV Q8H Count includes the [facility]


   Stop: 04/18/18 09:59


   Last Admin: 04/18/18 10:28 Dose:  100 mls/hr


Insulin Aspart (Novolog)  1 unit SUBCUT ONETIME ONE


   Stop: 04/17/18 17:31


   Last Admin: 04/17/18 17:20 Dose:  1 units


Ketamine HCl (Ketalar)  34 mg IV ASDIRECTED Count includes the [facility]


Labetalol HCl (Normodyne) Confirm Administered Dose 20 mg .ROUTE .STK-MED ONE


   Stop: 04/17/18 16:08


Linezolid (Zyvox)  200 mg IRR .STK-MED ONE


   Stop: 04/17/18 15:09


   Last Admin: 04/17/18 15:08 Dose:  200 mg


Meropenem (Merrem) Confirm Administered Dose 500 mg .ROUTE .STK-MED ONE


   Stop: 04/17/18 15:46


   Last Admin: 04/17/18 15:48 Dose:  500 mg


Ondansetron HCl (Zofran) Confirm Administered Dose 4 mg .ROUTE .STK-MED ONE


   Stop: 04/17/18 09:16


Povidone Iodine (Betadine 10% Soln) Confirm Administered Dose 1 ml .ROUTE .STK-

MED ONE


   Stop: 04/17/18 10:25


Propofol (Diprivan  20 Ml) Confirm Administered Dose 200 mg .ROUTE .STK-MED ONE


   Stop: 04/17/18 09:16


Propofol (Diprivan  20 Ml) Confirm Administered Dose 600 mg .ROUTE .STK-MED ONE


   Stop: 04/17/18 12:18


Propofol (Diprivan  20 Ml) Confirm Administered Dose 400 mg .ROUTE .STK-MED ONE


   Stop: 04/17/18 14:43


Propofol (Diprivan  20 Ml) Confirm Administered Dose 400 mg .ROUTE .STK-MED ONE


   Stop: 04/17/18 16:15


Rocuronium Bromide (Zemuron) Confirm Administered Dose 50 mg .ROUTE .STK-MED ONE


   Stop: 04/17/18 09:16


Succinylcholine Chloride (Quelicin) Confirm Administered Dose 200 mg .ROUTE .STK

-MED ONE


   Stop: 04/17/18 09:16











- Exam


General: Alert, Oriented, Cooperative, No Acute Distress


HEENT: Pupils Equal, EOMI


Lungs: Clear to Auscultation, Normal Respiratory Effort


Cardiovascular: Regular Rate, Regular Rhythm, No Murmurs


Skin: Warm, Dry


Neurological: Normal Speech


Psy/Mental Status: Alert, Normal Affect, Normal Mood (pt is AO X3 and very 

pleasant today. pt is sitting up in chair)





- Problem List & Annotations


(1) Lumbar discogenic pain syndrome


SNOMED Code(s): 23052161


   Code(s): M51.26 - OTHER INTERVERTEBRAL DISC DISPLACEMENT, LUMBAR REGION   

Status: Chronic   Current Visit: No   





(2) Lumbar foraminal stenosis


SNOMED Code(s): 572699463583, 346271244384


   Code(s): M99.83 - OTHER BIOMECHANICAL LESIONS OF LUMBAR REGION   Status: 

Chronic   Current Visit: No   





(3) Status post lumbar spinal fusion


SNOMED Code(s): 34497366461213, 14690289, 10727284861273


   Code(s): Z98.1 - ARTHRODESIS STATUS   Status: Acute   Current Visit: Yes   

Annotation/Comment:: ALIF l4 -L5, L5 -S1   





- Problem List Review


Problem List Initiated/Reviewed/Updated: Yes





- My Orders


Last 24 Hours: 


 Active Orders 24 hr











 Category Date Time Status


 


 Admission Status [Patient Status] [ADT] Routine ADT  04/17/18 16:50 Active


 


 Ambulate [RC] ASDIRECTED Care  04/18/18 11:07 Active


 


 Drain Management [RC] ASDIRECTED Care  04/18/18 11:11 Active


 


 Head of Bed Elevation [RC] CONTINUOUS Care  04/18/18 11:07 Active


 


 Overnight Pulse Oximetry [RC] Click to Edit Care  04/17/18 16:50 Active


 


 Pneumonia Education [RC] UPON Care  04/18/18 11:07 Active


 


 RT Incentive Spirometry [RC] Q1HR Care  04/18/18 11:07 Active


 


 Turn, Cough, Deep Breathe [RC] Q1HWA Care  04/18/18 11:07 Active


 


 Up to Chair [RC] TIDMEALS Care  04/18/18 11:07 Active


 


 OT Evaluation and Treatment [CONS] Routine Cons  04/18/18 07:00 Active


 


 PT Evaluation and Treatment [CONS] Routine Cons  04/18/18 07:00 Active


 


 Respiratory Care Assess and Treatment [CONS] Routine Cons  04/18/18 11:07 

Active


 


 Consistent Carbohydrate Diet [DIET] Diet  04/18/18 Breakfast Active


 


 GLUCOSE POC LAB TO COLLECT [POC] QIDACANDBED Lab  04/18/18 16:30 Ordered


 


 GLUCOSE POC LAB TO COLLECT [POC] QIDACANDBED Lab  04/18/18 21:00 Ordered


 


 GLUCOSE POC LAB TO COLLECT [POC] QIDACANDBED Lab  04/19/18 07:30 Ordered


 


 GLUCOSE POC LAB TO COLLECT [POC] QIDACANDBED Lab  04/19/18 11:30 Ordered


 


 GLUCOSE POC LAB TO COLLECT [POC] QIDACANDBED Lab  04/19/18 16:30 Ordered


 


 GLUCOSE POC LAB TO COLLECT [POC] QIDACANDBED Lab  04/19/18 21:00 Ordered


 


 GLUCOSE POC LAB TO COLLECT [POC] QIDACANDBED Lab  04/20/18 07:30 Ordered


 


 Acetaminophen/oxyCODONE [Percocet 325-5 MG] Med  04/18/18 07:24 Active





 1 - 2 tab PO Q4H PRN   


 


 Aspirin [Halfprin] Med  04/18/18 09:00 Active





 81 mg PO DAILY   


 


 Cyclobenzaprine [Flexeril] Med  04/17/18 18:00 Active





 10 mg PO Q6H PRN   


 


 Cyclobenzaprine [Flexeril] Med  04/18/18 07:26 Active





 10 mg PO TID PRN   


 


 Dextrose 5%-Lactated Ringers 1,000 ml Med  04/17/18 17:45 Active





 IV ASDIRECTED   


 


 Dextrose 50% in Water Med  04/17/18 17:27 Active





 50 ml IVPUSH ASDIRECTED PRN   


 


 Dextrose [Glutose 15] Med  04/17/18 17:27 Active





 15 gm PO ASDIRECTED PRN   


 


 Docusate Sodium [Colace] Med  04/18/18 09:00 Active





 100 mg PO BID   


 


 Gabapentin [Neurontin] Med  04/18/18 08:00 Active





 600 mg PO QID   


 


 Glucagon,Human Recombinant [GlucaGen] Med  04/17/18 17:27 Active





 1 mg IM ASDIRECTED PRN   


 


 Insulin Aspart [NovoLOG] Med  04/17/18 17:27 Active





 0 unit SUBCUT QID PRN   


 


 Insulin Detemir [Levemir] Med  04/17/18 21:00 Active





 20 unit SUBCUT BID   


 


 Lisinopril [Prinivil] Med  04/18/18 09:00 Active





 2.5 mg PO DAILY   


 


 Non-Formulary Medication [NF Drug] Med  04/17/18 18:00 Active





 1 each TOP DAILY   


 


 Ondansetron [Zofran] Med  04/17/18 17:29 Active





 4 mg IVPUSH Q4H PRN   


 


 Pantoprazole [ProTONIX IV***] Med  04/17/18 17:30 Active





 40 mg IV Q24H   


 


 Ticagrelor [Brilinta] Med  04/18/18 09:00 Active





 90 mg PO BID   


 


 hydrOXYzine HCl [Atarax] Med  04/17/18 17:31 Active





 100 mg PO Q4H PRN   


 


 hydrOXYzine HCl [Vistaril] Med  04/17/18 17:30 Active





 100 mg IM Q4H PRN   


 


 Pulse Oximetry Continuous Monitoring [OM.PC] Routine Oth  04/17/18 16:50 

Ordered








 Medication Orders





Aspirin (Halfprin)  81 mg PO DAILY Count includes the [facility]


   Last Admin: 04/18/18 08:27  Dose: 81 mg


Cyclobenzaprine HCl (Flexeril)  10 mg PO Q6H PRN


   PRN Reason: MUSCLE SPASM


Cyclobenzaprine HCl (Flexeril)  10 mg PO TID PRN


   PRN Reason: Muscle Spasm


Dextrose (Glutose 15)  15 gm PO ASDIRECTED PRN


   PRN Reason: HYPOGLYCEMIA


Dextrose/Water (Dextrose 50% In Water)  50 ml IVPUSH ASDIRECTED PRN


   PRN Reason: HYPOGLYCEMIA


Docusate Sodium (Colace)  100 mg PO BID Count includes the [facility]


   Last Admin: 04/18/18 08:27  Dose: 100 mg


Gabapentin (Neurontin)  600 mg PO QID Count includes the [facility]


   Last Admin: 04/18/18 10:25 Dose:  Not Given


   Admin: 04/18/18 08:26  Dose: 600 mg


Glucagon (Glucagen)  1 mg IM ASDIRECTED PRN


   PRN Reason: HYPOGLYCEMIA


Hydromorphone HCl (Dilaudid Pca 15 Mg In Ns 30 Ml)  0 mg IV ASDIRECTED PRN; 

Protocol


   PRN Reason: Pain


   Last Admin: 04/17/18 10:43  Dose: 0.3 mg


Hydroxyzine HCl (Vistaril)  100 mg IM Q4H PRN


   PRN Reason: PAIN


Hydroxyzine HCl (Atarax)  100 mg PO Q4H PRN


   PRN Reason: PAIN


Dextrose/Lactated Ringer's (Dextrose 5%-Lactated Ringers)  1,000 mls @ 150 mls/

hr IV ASDIRECTED Count includes the [facility]


   Last Admin: 04/18/18 12:38  Dose: 150 mls/hr


   Infusion: 04/18/18 08:58  Dose: 150 mls/hr


   Admin: 04/18/18 02:17  Dose: 150 mls/hr


   Infusion: 04/18/18 01:30  Dose: 150 mls/hr


   Admin: 04/17/18 18:49  Dose: 150 mls/hr


Insulin Aspart (Novolog)  0 unit SUBCUT QID PRN; Protocol


   PRN Reason: LOW CORRECTIONAL DOSE


   Last Admin: 04/18/18 11:58  Dose: 4 units


   Admin: 04/17/18 21:54  Dose: 4 units


Insulin Detemir (Levemir)  20 unit SUBCUT BID Count includes the [facility]


   Last Admin: 04/18/18 08:31  Dose: 20 units


   Admin: 04/17/18 21:51  Dose: 20 units


Lisinopril (Prinivil)  2.5 mg PO DAILY Count includes the [facility]


   Last Admin: 04/18/18 08:34  Dose: 2.5 mg


Naloxone HCl (Narcan)  0.1 mg IV ASDIRECTED PRN


   PRN Reason: decreased respiratory rate


Scopolamine Patch (Check)  1 each TOP DAILY Count includes the [facility]


   Last Admin: 04/18/18 08:28 Dose:  Not Given


   Admin: 04/17/18 18:42 Dose:  Not Given


Ondansetron HCl (Zofran)  4 mg IVPUSH Q4H PRN


   PRN Reason: NAUSEA


   Last Admin: 04/17/18 23:22  Dose: 4 mg


Oxycodone/Acetaminophen (Percocet 325-5 Mg)  1 - 2 tab PO Q4H PRN


   PRN Reason: paiin


Pantoprazole Sodium (Protonix Iv***)  40 mg IV Q24H Count includes the [facility]


   Last Admin: 04/17/18 18:48  Dose: 40 mg


Scopolamine (Transderm-Scop)  1.5 mg TOP Q72H Count includes the [facility]


   Last Admin: 04/17/18 09:06  Dose: 1.5 mg


Ticagrelor (Brilinta)  90 mg PO BID JOAN


   Last Admin: 04/18/18 08:27  Dose: 90 mg











- Assessment


Assessment           (Free Text/Narrative):: 





Post op ALIF








- Plan


Plan                        (Free Text/Narrative):: 





-We start pts home meds today at their current dosages


-we will DC PCA and start oral percocet for pain


-Sweeney can be removed today


-pt my start regular diet


-will give pt collace

## 2018-04-19 VITALS — DIASTOLIC BLOOD PRESSURE: 78 MMHG | SYSTOLIC BLOOD PRESSURE: 149 MMHG

## 2018-04-19 RX ADMIN — Medication SCH EACH: at 08:23

## 2018-04-19 RX ADMIN — INSULIN DETEMIR SCH UNITS: 100 INJECTION, SOLUTION SUBCUTANEOUS at 08:19

## 2018-04-19 NOTE — PCM.PN
- General Info


Functional Status: Reports: Pain Controlled





- Review of Systems


General: Reports: No Symptoms


HEENT: Reports: No Symptoms


Pulmonary: Reports: No Symptoms


Cardiovascular: Reports: No Symptoms


Gastrointestinal: Reports: No Symptoms


Genitourinary: Reports: No Symptoms


Musculoskeletal: Reports: Back Pain, Leg Pain


Skin: Reports: No Symptoms


Neurological: Reports: No Symptoms


Psychiatric: Reports: No Symptoms





- Patient Data


Vitals - Most Recent: 


 Last Vital Signs











Temp  98.2 F   04/19/18 07:53


 


Pulse  94   04/19/18 07:53


 


Resp  18   04/19/18 07:53


 


BP  149/78 H  04/19/18 08:24


 


Pulse Ox  95   04/19/18 07:53











Weight - Most Recent: 185 lb 0.014 oz


I&O - Last 24 Hours: 


 Intake & Output











 04/18/18 04/19/18 04/19/18





 22:59 06:59 14:59


 


Intake Total 2386 1955 


 


Output Total 1025 602 


 


Balance 1361 1353 











Med Orders - Current: 


 Current Medications





Aspirin (Halfprin)  81 mg PO DAILY [facility]


   Last Admin: 04/19/18 08:23 Dose:  81 mg


Cyclobenzaprine HCl (Flexeril)  10 mg PO Q6H PRN


   PRN Reason: MUSCLE SPASM


Cyclobenzaprine HCl (Flexeril)  10 mg PO TID PRN


   PRN Reason: Muscle Spasm


Dextrose (Glutose 15)  15 gm PO ASDIRECTED PRN


   PRN Reason: HYPOGLYCEMIA


Dextrose/Water (Dextrose 50% In Water)  50 ml IVPUSH ASDIRECTED PRN


   PRN Reason: HYPOGLYCEMIA


Docusate Sodium (Colace)  100 mg PO BID [facility]


   Last Admin: 04/19/18 08:23 Dose:  100 mg


Gabapentin (Neurontin)  600 mg PO QID [facility]


   Last Admin: 04/19/18 05:21 Dose:  600 mg


Glucagon (Glucagen)  1 mg IM ASDIRECTED PRN


   PRN Reason: HYPOGLYCEMIA


Hydroxyzine HCl (Vistaril)  100 mg IM Q4H PRN


   PRN Reason: PAIN


Hydroxyzine HCl (Atarax)  100 mg PO Q4H PRN


   PRN Reason: PAIN


Insulin Aspart (Novolog)  0 unit SUBCUT QID PRN; Protocol


   PRN Reason: LOW CORRECTIONAL DOSE


   Last Admin: 04/19/18 08:20 Dose:  1 units


Insulin Detemir (Levemir)  20 unit SUBCUT BID [facility]


   Last Admin: 04/19/18 08:19 Dose:  20 units


Lisinopril (Prinivil)  2.5 mg PO DAILY [facility]


   Last Admin: 04/19/18 08:24 Dose:  2.5 mg


Naloxone HCl (Narcan)  0.1 mg IV ASDIRECTED PRN


   PRN Reason: decreased respiratory rate


Scopolamine Patch (Check)  1 each TOP DAILY [facility]


   Last Admin: 04/19/18 08:23 Dose:  1 each


Ondansetron HCl (Zofran)  4 mg IVPUSH Q4H PRN


   PRN Reason: NAUSEA


   Last Admin: 04/17/18 23:22 Dose:  4 mg


Oxycodone/Acetaminophen (Percocet 325-5 Mg)  1 - 2 tab PO Q4H PRN


   PRN Reason: paiin


   Last Admin: 04/19/18 05:27 Dose:  2 tab


Pantoprazole Sodium (Protonix Iv***)  40 mg IV Q24H [facility]


   Last Admin: 04/18/18 17:41 Dose:  40 mg


Scopolamine (Transderm-Scop)  1.5 mg TOP Q72H [facility]


   Last Admin: 04/17/18 09:06 Dose:  1.5 mg


Ticagrelor (Brilinta)  90 mg PO BID [facility]


   Last Admin: 04/19/18 08:23 Dose:  90 mg





Discontinued Medications





Acetaminophen (Tylenol Extra Strength)  1,000 mg PO ONETIME ONE


   Stop: 04/17/18 08:31


   Last Admin: 04/17/18 09:06 Dose:  1,000 mg


Acetaminophen (Tylenol Extra Strength)  1,000 mg PO Q6H [facility]


   Last Admin: 04/18/18 05:54 Dose:  1,000 mg


Aspirin (Halfprin)  81 mg PO DAILY [facility]


   Last Admin: 04/18/18 08:28 Dose:  Not Given


Cyclobenzaprine HCl (Flexeril)  10 mg PO Q8H PRN


   PRN Reason: MUSCLE SPASM


Dexamethasone (Dexamethasone) Confirm Administered Dose 4 mg .ROUTE .STK-MED ONE


   Stop: 04/17/18 09:16


Fentanyl (Sublimaze) Confirm Administered Dose 250 mcg .ROUTE .STK-MED ONE


   Stop: 04/17/18 09:16


Fentanyl (Sublimaze) Confirm Administered Dose 250 mcg .ROUTE .STK-MED ONE


   Stop: 04/17/18 13:28


Fentanyl (Sublimaze) Confirm Administered Dose 250 mcg .ROUTE .STK-MED ONE


   Stop: 04/17/18 13:46


Fentanyl (Sublimaze) Confirm Administered Dose 250 mcg .ROUTE .STK-MED ONE


   Stop: 04/17/18 14:02


Fentanyl (Sublimaze) Confirm Administered Dose 250 mcg .ROUTE .STK-MED ONE


   Stop: 04/17/18 15:38


Gabapentin (Neurontin)  300 mg PO ONETIME ONE


   Stop: 04/17/18 08:31


   Last Admin: 04/17/18 09:07 Dose:  300 mg


Gabapentin (Neurontin)  300 mg PO TID [facility]


   Last Admin: 04/17/18 21:51 Dose:  300 mg


Hydromorphone HCl (Dilaudid Pca 15 Mg In Ns 30 Ml)  0 mg IV ASDIRECTED PRN; 

Protocol


   PRN Reason: Pain


   Last Admin: 04/17/18 10:43 Dose:  0.3 mg


Hydroxyzine HCl (Vistaril)  100 mg IM ONETIME ONE


   Stop: 04/17/18 16:31


   Last Admin: 04/17/18 17:31 Dose:  100 mg


Cefazolin Sodium/Dextrose 2 gm (/ Premix)  50 mls @ 100 mls/hr IV ONETIME ONE


   Stop: 04/17/18 09:29


   Last Admin: 04/17/18 12:59 Dose:  100 mls/hr


Lactated Ringer's (Ringers, Lactated)  1,000 mls @ 100 mls/hr IV ASDIRECTED [facility]


   Last Admin: 04/17/18 09:07 Dose:  100 mls/hr


Tranexamic Acid 900 mg/ Sodium (Chloride)  59 mls @ 236 mls/hr IV Q3H [facility]


   Stop: 04/17/18 13:14


   Last Admin: 04/17/18 17:27 Dose:  236 mls/hr


Ketamine HCl 100 mg/ Sodium (Chloride)  100 mls @ 20.58 mls/hr IV ASDIRECTED [facility]


Linezolid (Zyvox) Confirm Administered Dose 100 mls @ as directed .ROUTE .STK-

MED ONE


   Stop: 04/17/18 10:25


Lactated Ringer's (Ringers, Lactated) Confirm Administered Dose 1,000 mls @ as 

directed .ROUTE .STK-MED ONE


   Stop: 04/17/18 16:24


Cefazolin Sodium/Dextrose 2 gm (/ Premix)  50 mls @ 100 mls/hr IV Q8H [facility]


   Stop: 04/18/18 09:59


   Last Admin: 04/18/18 10:28 Dose:  100 mls/hr


Dextrose/Lactated Ringer's (Dextrose 5%-Lactated Ringers)  1,000 mls @ 150 mls/

hr IV ASDIRECTED [facility]


   Last Admin: 04/19/18 00:25 Dose:  150 mls/hr


Insulin Aspart (Novolog)  1 unit SUBCUT ONETIME ONE


   Stop: 04/17/18 17:31


   Last Admin: 04/17/18 17:20 Dose:  1 units


Ketamine HCl (Ketalar)  34 mg IV ASDIRECTED [facility]


Labetalol HCl (Normodyne) Confirm Administered Dose 20 mg .ROUTE .STK-MED ONE


   Stop: 04/17/18 16:08


Linezolid (Zyvox)  200 mg IRR .STK-MED ONE


   Stop: 04/17/18 15:09


   Last Admin: 04/17/18 15:08 Dose:  200 mg


Meropenem (Merrem) Confirm Administered Dose 500 mg .ROUTE .STK-MED ONE


   Stop: 04/17/18 15:46


   Last Admin: 04/17/18 15:48 Dose:  500 mg


Ondansetron HCl (Zofran) Confirm Administered Dose 4 mg .ROUTE .STK-MED ONE


   Stop: 04/17/18 09:16


Povidone Iodine (Betadine 10% Soln) Confirm Administered Dose 1 ml .ROUTE .STK-

MED ONE


   Stop: 04/17/18 10:25


Propofol (Diprivan  20 Ml) Confirm Administered Dose 200 mg .ROUTE .STK-MED ONE


   Stop: 04/17/18 09:16


Propofol (Diprivan  20 Ml) Confirm Administered Dose 600 mg .ROUTE .STK-MED ONE


   Stop: 04/17/18 12:18


Propofol (Diprivan  20 Ml) Confirm Administered Dose 400 mg .ROUTE .STK-MED ONE


   Stop: 04/17/18 14:43


Propofol (Diprivan  20 Ml) Confirm Administered Dose 400 mg .ROUTE .STK-MED ONE


   Stop: 04/17/18 16:15


Rocuronium Bromide (Zemuron) Confirm Administered Dose 50 mg .ROUTE .STK-MED ONE


   Stop: 04/17/18 09:16


Succinylcholine Chloride (Quelicin) Confirm Administered Dose 200 mg .ROUTE .STK

-MED ONE


   Stop: 04/17/18 09:16











- Exam


General: Alert, Oriented


HEENT: Pupils Equal, Pupils Reactive, Mucous Membr. Moist/Pink


Neck: Supple, Trachea Midline


Back Exam: Normal Inspection


Extremities: Normal Inspection, Normal Range of Motion


Skin: Warm, Dry, Intact


Wound/Incisions: Healing Well


Neurological: No New Focal Deficit


Psy/Mental Status: Alert, Normal Affect, Normal Mood





- Problem List Review


Problem List Initiated/Reviewed/Updated: Yes





- My Orders


Last 24 Hours: 


My Active Orders





04/19/18 11:30


GLUCOSE POC LAB TO COLLECT [POC] QIDACANDBED 





04/19/18 16:30


GLUCOSE POC LAB TO COLLECT [POC] QIDACANDBED 





04/19/18 21:00


GLUCOSE POC LAB TO COLLECT [POC] QIDACANDBED 





04/20/18 07:30


GLUCOSE POC LAB TO COLLECT [POC] QIDACANDBED 














- Plan


Plan:: 


 assessment: Postoperative day #2 anterior lumbar interbody fusion L4-5 and L5-

S1





Plan: Patient is been doing quite well. He has been walking without difficulty. 

His back and his legs feel much better. He will be discharged today per day and 

Suazo. We will see him back in one month time.

## 2018-04-19 NOTE — PCM.DCSUM1
**Discharge Summary





- Hospital Course


HPI Initial Comments: 





Pt underwent ALIF surgery on levels L4-L5 and L5-S1 on 04/17/2018 for Lumbar 

stenosis L4-S1


Brief History: Pt underwent ALIF surgery on 04/17/2018 of levels L4-L5 and L5-

S1. Pt was admitted to hospital and has done very well through the course of 

his stay. Post op day 1 pt was ambulating and put on a regular diet. Pts blood 

sugars were a bit high in the low 300's during his stay. Pt has no complaints 

other than incisional site pain which he is tolerating well with oral percocet. 

Pt was started back on his home meds yesterday. Pts incision is seen without 

erythma or drainage. Pts JEAN tube only put out 5 ml last night. Pt has been 

passing gas but has not yet had a BM as of this morning. We discussed using OTC 

medications for constipation when he gets home.





- Discharge Data


Discharge Date: 04/19/18


Discharge Disposition: Home, Self-Care 01


Condition: Good





- Discharge Diagnosis/Problem(s)


(1) Lumbar discogenic pain syndrome


SNOMED Code(s): 94321146


   ICD Code: M51.26 - OTHER INTERVERTEBRAL DISC DISPLACEMENT, LUMBAR REGION   

Status: Chronic   Current Visit: No   





(2) Lumbar foraminal stenosis


SNOMED Code(s): 002155221911, 273261433919


   ICD Code: M99.83 - OTHER BIOMECHANICAL LESIONS OF LUMBAR REGION   Status: 

Chronic   Current Visit: No   





(3) Status post lumbar spinal fusion


SNOMED Code(s): 89295191480817, 40353761, [id number]


   ICD Code: Z98.1 - ARTHRODESIS STATUS   Status: Acute   Current Visit: Yes   

Problem Details: ALIF l4 -L5, L5 -S1   





- Patient Summary/Data


Consults: 


 Consultations





04/18/18 07:00


OT Evaluation and Treatment [CONS] Routine 


   Please Evaluate and Treat.


   OT Reason for Consult: Other (Type Response)


   Pending Discharge: post op ortho surgery


   This query below is only for informational purposes and is not editable.


PT Evaluation and Treatment [CONS] Routine 


   Please Evaluate and Treat.


   PT Reason for Consult: Post op Ortho Surgery


   This query below is only for informational purposes and is not editable.





04/18/18 11:07


Respiratory Care Assess and Treatment [CONS] Routine 


   Comment: 


   Physician Instructions: 














- Patient Instructions


Diet: Drink 8-10+ Glasses/Day, Diabetic Diet


Activity: No Lifting Over 10 Pounds


Activity, Other: As Directed By Dr. Pavan Suazo and Physical Therapy


Driving: Do Not Drive


Showering/Bathing: May Shower


Wound/Incision Care: Keep Operative Site/Wound Site Clean and Dry


Other/Special Instructions: 1. Use incentive inspirometer 10 times every hour 

while awake for 1 week.  2. Call Tia with Blood Sugars on Friday, 4/20/18 - 

Check blood sugars per your routine.





- Discharge Plan


Prescriptions/Med Rec: 


Acetaminophen/oxyCODONE [Percocet 325-5 MG] 1 - 2 tab PO Q4H PRN #40 tablet


 PRN Reason: Pain


Docusate Sodium [Colace] 100 mg PO BID #100 cap


Home Medications: 


 Home Meds





Aspirin [Halfprin] 81 mg PO DAILY 03/10/15 [History]


Gabapentin [Neurontin] 600 mg PO QID 03/10/15 [History]


Insulin Glarg,Human.Rec.Analog [Lantus] 20 unit SQ BID 03/10/15 [History]


Cyclobenzaprine [Flexeril] 10 mg PO TID PRN 12/07/17 [History]


Insulin Glargine,Hum.Rec.Anlog [Basaglar Kwikpen U-100] 20 unit SQ Q12HR 12/07/ 17 [History]


Ticagrelor [Brilinta] 90 mg PO BID 12/07/17 [History]


Acetaminophen/oxyCODONE [Percocet 325-5 MG] 1 - 2 tab PO Q4H PRN #40 tablet 04/ 19/18 [Rx]


Docusate Sodium [Colace] 100 mg PO BID #100 cap 04/19/18 [Rx]








Patient Handouts:  Constipation, Adult, Percutaneous Endoscopic Jejunostomy, 

Care After, Incision Care, Adult


Referrals: 


Tahir Suazo DO [Physician] - 05/10/18 9:00 am


(Per his direction 


)


Henrry Suazo MD [Family Provider] - 05/02/18 10:00 am





- General Info


Date of Service: 04/19/18


Admission Dx/Problem (Free Text: 





Lumbar stenosis L4-L5 and L5-S1


Functional Status: Reports: Pain Controlled, Tolerating Diet, Ambulating, 

Urinating, Incentive Spirometry





- Review of Systems


General: Reports: No Symptoms


Pulmonary: Reports: No Symptoms


Cardiovascular: Reports: No Symptoms


Gastrointestinal: Reports: Abdominal Pain


Genitourinary: Reports: No Symptoms


Musculoskeletal: Reports: No Symptoms


Skin: Reports: No Symptoms


Neurological: Reports: No Symptoms (Pt states he is doing very well and is 

ready to go home. Pt has no complaints other than some pain at incisional site)





- Patient Data


Vitals - Most Recent: 


 Last Vital Signs











Temp  36.8 C   04/19/18 07:53


 


Pulse  94   04/19/18 07:53


 


Resp  18   04/19/18 07:53


 


BP  149/78 H  04/19/18 08:24


 


Pulse Ox  95   04/19/18 07:53











Weight - Most Recent: 83.915 kg


I&O - Last 24 hours: 


 Intake & Output











 04/18/18 04/19/18 04/19/18





 22:59 06:59 14:59


 


Intake Total 2386 1955 


 


Output Total 1025 602 


 


Balance 1361 1353 











Med Orders - Current: 


 Current Medications





Aspirin (Halfprin)  81 mg PO DAILY [facility]


   Last Admin: 04/19/18 08:23 Dose:  81 mg


Cyclobenzaprine HCl (Flexeril)  10 mg PO Q6H PRN


   PRN Reason: MUSCLE SPASM


Cyclobenzaprine HCl (Flexeril)  10 mg PO TID PRN


   PRN Reason: Muscle Spasm


Dextrose (Glutose 15)  15 gm PO ASDIRECTED PRN


   PRN Reason: HYPOGLYCEMIA


Dextrose/Water (Dextrose 50% In Water)  50 ml IVPUSH ASDIRECTED PRN


   PRN Reason: HYPOGLYCEMIA


Docusate Sodium (Colace)  100 mg PO BID [facility]


   Last Admin: 04/19/18 08:23 Dose:  100 mg


Gabapentin (Neurontin)  600 mg PO QID [facility]


   Last Admin: 04/19/18 05:21 Dose:  600 mg


Glucagon (Glucagen)  1 mg IM ASDIRECTED PRN


   PRN Reason: HYPOGLYCEMIA


Hydroxyzine HCl (Vistaril)  100 mg IM Q4H PRN


   PRN Reason: PAIN


Hydroxyzine HCl (Atarax)  100 mg PO Q4H PRN


   PRN Reason: PAIN


Insulin Aspart (Novolog)  0 unit SUBCUT QID PRN; Protocol


   PRN Reason: LOW CORRECTIONAL DOSE


   Last Admin: 04/19/18 08:20 Dose:  1 units


Insulin Detemir (Levemir)  20 unit SUBCUT BID [facility]


   Last Admin: 04/19/18 08:19 Dose:  20 units


Lisinopril (Prinivil)  2.5 mg PO DAILY [facility]


   Last Admin: 04/19/18 08:24 Dose:  2.5 mg


Naloxone HCl (Narcan)  0.1 mg IV ASDIRECTED PRN


   PRN Reason: decreased respiratory rate


Scopolamine Patch (Check)  1 each TOP DAILY [facility]


   Last Admin: 04/19/18 08:23 Dose:  1 each


Ondansetron HCl (Zofran)  4 mg IVPUSH Q4H PRN


   PRN Reason: NAUSEA


   Last Admin: 04/17/18 23:22 Dose:  4 mg


Oxycodone/Acetaminophen (Percocet 325-5 Mg)  1 - 2 tab PO Q4H PRN


   PRN Reason: paiin


   Last Admin: 04/19/18 05:27 Dose:  2 tab


Pantoprazole Sodium (Protonix Iv***)  40 mg IV Q24H [facility]


   Last Admin: 04/18/18 17:41 Dose:  40 mg


Scopolamine (Transderm-Scop)  1.5 mg TOP Q72H [facility]


   Last Admin: 04/17/18 09:06 Dose:  1.5 mg


Ticagrelor (Brilinta)  90 mg PO BID [facility]


   Last Admin: 04/19/18 08:23 Dose:  90 mg





Discontinued Medications





Acetaminophen (Tylenol Extra Strength)  1,000 mg PO ONETIME ONE


   Stop: 04/17/18 08:31


   Last Admin: 04/17/18 09:06 Dose:  1,000 mg


Acetaminophen (Tylenol Extra Strength)  1,000 mg PO Q6H [facility]


   Last Admin: 04/18/18 05:54 Dose:  1,000 mg


Aspirin (Halfprin)  81 mg PO DAILY [facility]


   Last Admin: 04/18/18 08:28 Dose:  Not Given


Cyclobenzaprine HCl (Flexeril)  10 mg PO Q8H PRN


   PRN Reason: MUSCLE SPASM


Dexamethasone (Dexamethasone) Confirm Administered Dose 4 mg .ROUTE .STK-MED ONE


   Stop: 04/17/18 09:16


Fentanyl (Sublimaze) Confirm Administered Dose 250 mcg .ROUTE .STK-MED ONE


   Stop: 04/17/18 09:16


Fentanyl (Sublimaze) Confirm Administered Dose 250 mcg .ROUTE .STK-MED ONE


   Stop: 04/17/18 13:28


Fentanyl (Sublimaze) Confirm Administered Dose 250 mcg .ROUTE .STK-MED ONE


   Stop: 04/17/18 13:46


Fentanyl (Sublimaze) Confirm Administered Dose 250 mcg .ROUTE .STK-MED ONE


   Stop: 04/17/18 14:02


Fentanyl (Sublimaze) Confirm Administered Dose 250 mcg .ROUTE .STK-MED ONE


   Stop: 04/17/18 15:38


Gabapentin (Neurontin)  300 mg PO ONETIME ONE


   Stop: 04/17/18 08:31


   Last Admin: 04/17/18 09:07 Dose:  300 mg


Gabapentin (Neurontin)  300 mg PO TID [facility]


   Last Admin: 04/17/18 21:51 Dose:  300 mg


Hydromorphone HCl (Dilaudid Pca 15 Mg In Ns 30 Ml)  0 mg IV ASDIRECTED PRN; 

Protocol


   PRN Reason: Pain


   Last Admin: 04/17/18 10:43 Dose:  0.3 mg


Hydroxyzine HCl (Vistaril)  100 mg IM ONETIME ONE


   Stop: 04/17/18 16:31


   Last Admin: 04/17/18 17:31 Dose:  100 mg


Cefazolin Sodium/Dextrose 2 gm (/ Premix)  50 mls @ 100 mls/hr IV ONETIME ONE


   Stop: 04/17/18 09:29


   Last Admin: 04/17/18 12:59 Dose:  100 mls/hr


Lactated Ringer's (Ringers, Lactated)  1,000 mls @ 100 mls/hr IV ASDIRECTED [facility]


   Last Admin: 04/17/18 09:07 Dose:  100 mls/hr


Tranexamic Acid 900 mg/ Sodium (Chloride)  59 mls @ 236 mls/hr IV Q3H [facility]


   Stop: 04/17/18 13:14


   Last Admin: 04/17/18 17:27 Dose:  236 mls/hr


Ketamine HCl 100 mg/ Sodium (Chloride)  100 mls @ 20.58 mls/hr IV ASDIRECTED [facility]


Linezolid (Zyvox) Confirm Administered Dose 100 mls @ as directed .ROUTE .STK-

MED ONE


   Stop: 04/17/18 10:25


Lactated Ringer's (Ringers, Lactated) Confirm Administered Dose 1,000 mls @ as 

directed .ROUTE .STK-MED ONE


   Stop: 04/17/18 16:24


Cefazolin Sodium/Dextrose 2 gm (/ Premix)  50 mls @ 100 mls/hr IV Q8H [facility]


   Stop: 04/18/18 09:59


   Last Admin: 04/18/18 10:28 Dose:  100 mls/hr


Dextrose/Lactated Ringer's (Dextrose 5%-Lactated Ringers)  1,000 mls @ 150 mls/

hr IV ASDIRECTED [facility]


   Last Admin: 04/19/18 00:25 Dose:  150 mls/hr


Insulin Aspart (Novolog)  1 unit SUBCUT ONETIME ONE


   Stop: 04/17/18 17:31


   Last Admin: 04/17/18 17:20 Dose:  1 units


Ketamine HCl (Ketalar)  34 mg IV ASDIRECTED [facility]


Labetalol HCl (Normodyne) Confirm Administered Dose 20 mg .ROUTE .STK-MED ONE


   Stop: 04/17/18 16:08


Linezolid (Zyvox)  200 mg IRR .STK-MED ONE


   Stop: 04/17/18 15:09


   Last Admin: 04/17/18 15:08 Dose:  200 mg


Meropenem (Merrem) Confirm Administered Dose 500 mg .ROUTE .STK-MED ONE


   Stop: 04/17/18 15:46


   Last Admin: 04/17/18 15:48 Dose:  500 mg


Ondansetron HCl (Zofran) Confirm Administered Dose 4 mg .ROUTE .STK-MED ONE


   Stop: 04/17/18 09:16


Povidone Iodine (Betadine 10% Soln) Confirm Administered Dose 1 ml .ROUTE .STK-

MED ONE


   Stop: 04/17/18 10:25


Propofol (Diprivan  20 Ml) Confirm Administered Dose 200 mg .ROUTE .STK-MED ONE


   Stop: 04/17/18 09:16


Propofol (Diprivan  20 Ml) Confirm Administered Dose 600 mg .ROUTE .STK-MED ONE


   Stop: 04/17/18 12:18


Propofol (Diprivan  20 Ml) Confirm Administered Dose 400 mg .ROUTE .STK-MED ONE


   Stop: 04/17/18 14:43


Propofol (Diprivan  20 Ml) Confirm Administered Dose 400 mg .ROUTE .STK-MED ONE


   Stop: 04/17/18 16:15


Rocuronium Bromide (Zemuron) Confirm Administered Dose 50 mg .ROUTE .STK-MED ONE


   Stop: 04/17/18 09:16


Succinylcholine Chloride (Quelicin) Confirm Administered Dose 200 mg .ROUTE .STK

-MED ONE


   Stop: 04/17/18 09:16











- Exam


General: Reports: Alert, Oriented, Cooperative, No Acute Distress


HEENT: Reports: Pupils Equal, EOMI


Lungs: Reports: Clear to Auscultation, Normal Respiratory Effort


Cardiovascular: Reports: Regular Rate, Regular Rhythm, No Murmurs


GI/Abdominal Exam: Soft


Skin: Reports: Warm, Dry


Wound/Incisions: Reports: Healing Well (Pts incision is seen without erythema 

or drainage. Pts JEAN tube had minimal drainage at 5 ml through the night last 

night)


Neurological: Reports: No New Focal Deficit, Normal Speech


Psy/Mental Status: Reports: Alert, Normal Affect, Normal Mood

## 2018-04-24 ENCOUNTER — HOSPITAL ENCOUNTER (EMERGENCY)
Dept: HOSPITAL 11 - JP.ED | Age: 58
Discharge: HOME | End: 2018-04-24
Payer: MEDICAID

## 2018-04-24 VITALS — SYSTOLIC BLOOD PRESSURE: 129 MMHG | DIASTOLIC BLOOD PRESSURE: 66 MMHG

## 2018-04-24 DIAGNOSIS — E86.0: Primary | ICD-10-CM

## 2018-04-24 DIAGNOSIS — I25.2: ICD-10-CM

## 2018-04-24 DIAGNOSIS — I10: ICD-10-CM

## 2018-04-24 DIAGNOSIS — Z79.899: ICD-10-CM

## 2018-04-24 DIAGNOSIS — Z88.2: ICD-10-CM

## 2018-04-24 DIAGNOSIS — Z79.82: ICD-10-CM

## 2018-04-24 DIAGNOSIS — T50.905A: ICD-10-CM

## 2018-04-24 DIAGNOSIS — E11.9: ICD-10-CM

## 2018-04-24 DIAGNOSIS — Z88.5: ICD-10-CM

## 2018-04-24 PROCEDURE — 83605 ASSAY OF LACTIC ACID: CPT

## 2018-04-24 PROCEDURE — 81001 URINALYSIS AUTO W/SCOPE: CPT

## 2018-04-24 PROCEDURE — 84484 ASSAY OF TROPONIN QUANT: CPT

## 2018-04-24 PROCEDURE — 96361 HYDRATE IV INFUSION ADD-ON: CPT

## 2018-04-24 PROCEDURE — 96374 THER/PROPH/DIAG INJ IV PUSH: CPT

## 2018-04-24 PROCEDURE — 96375 TX/PRO/DX INJ NEW DRUG ADDON: CPT

## 2018-04-24 PROCEDURE — 36415 COLL VENOUS BLD VENIPUNCTURE: CPT

## 2018-04-24 PROCEDURE — 80053 COMPREHEN METABOLIC PANEL: CPT

## 2018-04-24 PROCEDURE — 99284 EMERGENCY DEPT VISIT MOD MDM: CPT

## 2018-04-24 PROCEDURE — 83690 ASSAY OF LIPASE: CPT

## 2018-04-24 PROCEDURE — 85025 COMPLETE CBC W/AUTO DIFF WBC: CPT

## 2018-04-24 RX ADMIN — PROCHLORPERAZINE EDISYLATE ONE MG: 5 INJECTION INTRAMUSCULAR; INTRAVENOUS at 06:27

## 2018-04-24 RX ADMIN — LORAZEPAM ONE MG: 2 INJECTION INTRAMUSCULAR; INTRAVENOUS at 06:51

## 2018-04-24 NOTE — EDM.PDOC
<OfficerJesse - Last Filed: 04/24/18 06:27>





ED HPI GENERAL MEDICAL PROBLEM





- General


Chief Complaint: General


Stated Complaint: MEDICAL VIA NORTH


Time Seen by Provider: 04/24/18 06:20


Source of Information: Reports: Patient, Family, Old Records, RN Notes Reviewed


History Limitations: Reports: No Limitations (Overall)





- History of Present Illness


INITIAL COMMENTS - FREE TEXT/NARRATIVE: 





58-year-old gentleman presents to the emergency department today complaint of 

nausea and vomiting he arrives via EMS services. He recently underwent lumbar 

surgery anterior approach approximately 4 days prior he states his back pain 

has improved but has had ongoing difficulty with severe nausea and vomiting 

unable to keep food products down he does use Zofran but he still vomiting 

through that, no shortness of breath no chest pain





- Related Data


 Allergies











Allergy/AdvReac Type Severity Reaction Status Date / Time


 


hydrocodone Allergy  Nausea and Verified 04/24/18 06:02





   Vomiting  


 


morphine AdvReac  Nausea and Verified 04/17/18 09:02





   Vomiting  


 


Sulfa (Sulfonamide AdvReac  Nausea and Verified 04/17/18 09:02





Antibiotics)   Vomiting  











Home Meds: 


 Home Meds





Aspirin [Halfprin] 81 mg PO DAILY 03/10/15 [History]


Gabapentin [Neurontin] 600 mg PO QID 03/10/15 [History]


Insulin Glarg,Human.Rec.Analog [Lantus] 20 unit SQ BID 03/10/15 [History]


Cyclobenzaprine [Flexeril] 10 mg PO TID PRN 12/07/17 [History]


Insulin Glargine,Hum.Rec.Anlog [Basaglar Kwikpen U-100] 20 unit SQ Q12HR 12/07/ 17 [History]


Ticagrelor [Brilinta] 90 mg PO BID 12/07/17 [History]


Docusate Sodium [Colace] 100 mg PO BID #100 cap 04/19/18 [Rx]











Past Medical History


Cardiovascular History: Reports: CAD, High Cholesterol, Hypertension, MI, Other 

(See Below)


Other Cardiovascular History: CABG X3 IN 2015


Gastrointestinal History: Reports: Cholelithiasis


Musculoskeletal History: Reports: Back Pain, Chronic


Neurological History: Reports: Concussion


Psychiatric History: Reports: Bipolar


Endocrine/Metabolic History: Reports: Diabetes, Type II, IDDM





- Infectious Disease History


Infectious Disease History: Reports: Chicken Pox





- Past Surgical History


HEENT Surgical History: Reports: Adenoidectomy, Myringotomy w Tube(s)


Cardiovascular Surgical History: Reports: Coronary Artery Bypass, Coronary 

Artery Stent


GI Surgical History: Reports: Cholecystectomy, Hernia, Abdominal


Male  Surgical History: Reports: Circumcision, Vasectomy


Endocrine Surgical History: Reports: None


Neurological Surgical History: Reports: Lumbar Spine


Musculoskeletal Surgical History: Reports: Arthroscopic Knee, Knee Replacement, 

Shoulder Surgery, Other (See Below)


Other Musculoskeletal Surgeries/Procedures:: back surgery, multiple  left leg 

surgery, left and right shoulder surgerys. recent L2,L3,L4 fussion through abd 

was just discharged on thursday





Social & Family History





- Family History


Family Medical History: Noncontributory


Cardiac: Reports: Heart Failure, MI


Neurological: Reports: None


Oncologic: Reports: Pancreatic





- Tobacco Use


Smoking Status *Q: Never Smoker


Second Hand Smoke Exposure: No





- Caffeine Use


Caffeine Use: Reports: None


Other Caffeine Use: diet





- Alcohol Use


Days Per Week of Alcohol Use: 0





- Recreational Drug Use


Recreational Drug Use: Yes


Recreational Drug Type: Reports: Marijuana/Hashish


Recreational Drug Use Frequency: Rarely





ED ROS GENERAL





- Review of Systems


Review Of Systems: See Below


Constitutional: Denies: Fever, Chills


HEENT: Reports: No Symptoms


Respiratory: Reports: No Symptoms


Cardiovascular: Reports: No Symptoms


GI/Abdominal: Reports: Abdominal Pain (From vomiting), Nausea, Vomiting


: Reports: No Symptoms


Musculoskeletal: Reports: No Symptoms


Skin: Reports: No Symptoms


Neurological: Reports: No Symptoms





ED EXAM, GENERAL





- Physical Exam


Exam: See Below


Exam Limited By: No Limitations


General Appearance: Alert, Mild Distress


Head: Atraumatic, Normocephalic


Neck: Normal Inspection, Supple, Non-Tender, Full Range of Motion


Respiratory/Chest: No Respiratory Distress, Lungs Clear, Normal Breath Sounds, 

No Accessory Muscle Use


Cardiovascular: Regular Rate, Rhythm, No Murmur


GI/Abdominal: Soft, Non-Tender


Back Exam: Normal Inspection, Full Range of Motion.  No: CVA Tenderness (R), 

CVA Tenderness (L)


Extremities: Normal Range of Motion, Non-Tender, No Pedal Edema


Neurological: Alert, Oriented





Course





- Vital Signs


Last Recorded V/S: 


 Last Vital Signs











Temp  36.0 C   04/24/18 07:22


 


Pulse  97   04/24/18 07:22


 


Resp  14   04/24/18 07:22


 


BP  129/66   04/24/18 07:22


 


Pulse Ox  98   04/24/18 07:22














- Orders/Labs/Meds


Orders: 


 Active Orders 24 hr











 Category Date Time Status


 


 Peripheral IV Care [RC] .AS DIRECTED Care  04/24/18 06:28 Active


 


 UA W/MICROSCOPIC [URIN] Urgent Lab  04/24/18 06:28 Ordered


 


 Lactated Ringers [Ringers, Lactated] 1,000 ml Med  04/24/18 06:30 Active





 IV ASDIRECTED   


 


 Sodium Chloride 0.9% [Normal Saline] 1,000 ml Med  04/24/18 07:45 Active





 IV ASDIRECTED   


 


 Sodium Chloride 0.9% [Saline Flush] Med  04/24/18 06:28 Active





 10 ml FLUSH ASDIRECTED PRN   


 


 Peripheral IV Insertion Adult [OM.PC] Urgent Oth  04/24/18 06:28 Ordered








 Medication Orders





Lactated Ringer's (Ringers, Lactated)  1,000 mls @ 999 mls/hr IV ASDIRECTED JOAN


   Last Admin: 04/24/18 06:41  Dose: 999 mls/hr


Sodium Chloride (Normal Saline)  1,000 mls @ 999 mls/hr IV ASDIRECTED JOAN


   Last Admin: 04/24/18 07:48  Dose: 999 mls/hr


Sodium Chloride (Saline Flush)  10 ml FLUSH ASDIRECTED PRN


   PRN Reason: Keep Vein Open








Labs: 


 Laboratory Tests











  04/24/18 04/24/18 04/24/18 Range/Units





  06:28 06:41 06:41 


 


WBC   11.6 H   (4.5-11.0)  K/uL


 


RBC   3.51 L   (4.30-5.90)  M/uL


 


Hgb   9.3 L D   (12.0-15.0)  g/dL


 


Hct   27.5 L   (40.0-54.0)  %


 


MCV   78 L   (80-98)  fL


 


MCH   27   (27-31)  pg


 


MCHC   34   (32-36)  %


 


Plt Count   373   (150-400)  K/uL


 


Neut % (Auto)   79 H   (36-66)  %


 


Lymph % (Auto)   11 L   (24-44)  %


 


Mono % (Auto)   8 H   (2-6)  %


 


Eos % (Auto)   1 L   (2-4)  %


 


Baso % (Auto)   0   (0-1)  %


 


Sodium    141  (140-148)  mmol/L


 


Potassium    3.2 L  (3.6-5.2)  mmol/L


 


Chloride    104  (100-108)  mmol/L


 


Carbon Dioxide    23  (21-32)  mmol/L


 


Anion Gap    17.2 H  (5.0-14.0)  mmol/L


 


BUN    19 H  (7-18)  mg/dL


 


Creatinine    0.8  (0.8-1.3)  mg/dL


 


Est Cr Clr Drug Dosing    100.65  mL/min


 


Estimated GFR (MDRD)    > 60  (>60)  


 


Glucose    183 H  ()  mg/dL


 


Lactic Acid     (0.4-2.0)  mmol/L


 


Calcium    8.4 L  (8.5-10.1)  mg/dL


 


Total Bilirubin    1.3 H D  (0.2-1.0)  mg/dL


 


AST    29  (15-37)  U/L


 


ALT    36  (12-78)  U/L


 


Alkaline Phosphatase    93  ()  U/L


 


Troponin I    < 0.017  (0.000-0.056)  ng/mL


 


Total Protein    6.5  (6.4-8.2)  g/dL


 


Albumin    2.5 L  (3.4-5.0)  g/dL


 


Globulin    4.0 H  (2.3-3.5)  g/dL


 


Albumin/Globulin Ratio    0.6 L  (1.2-2.2)  


 


Lipase    102  ()  U/L


 


Urine Color  Yellow    


 


Urine Appearance  Slightly cloudy    


 


Urine pH  9.0 H    (4.5-8.0)  


 


Ur Specific Gravity  1.010    (1.008-1.030)  


 


Urine Protein  Negative    (NEGATIVE)  mg/dL


 


Urine Glucose (UA)  50 H    (NEGATIVE)  mg/dL


 


Urine Ketones  50 H    (NEGATIVE)  mg/dL


 


Urine Occult Blood  Negative    (NEGATIVE)  


 


Urine Nitrite  Negative    (NEGAITVE)  


 


Urine Bilirubin  Negative    (NEGATIVE)  


 


Urine Urobilinogen  Normal    (NORMAL)  mg/dL


 


Ur Leukocyte Esterase  Negative    (NEGATIVE)  


 


Urine RBC  0-5    (0-5)  


 


Urine WBC  Not seen    (0-5)  


 


Ur Epithelial Cells  Not seen    


 


Amorphous Sediment  Not seen    


 


Urine Bacteria  Not seen    


 


Urine Mucus  Few    














  04/24/18 Range/Units





  06:41 


 


WBC   (4.5-11.0)  K/uL


 


RBC   (4.30-5.90)  M/uL


 


Hgb   (12.0-15.0)  g/dL


 


Hct   (40.0-54.0)  %


 


MCV   (80-98)  fL


 


MCH   (27-31)  pg


 


MCHC   (32-36)  %


 


Plt Count   (150-400)  K/uL


 


Neut % (Auto)   (36-66)  %


 


Lymph % (Auto)   (24-44)  %


 


Mono % (Auto)   (2-6)  %


 


Eos % (Auto)   (2-4)  %


 


Baso % (Auto)   (0-1)  %


 


Sodium   (140-148)  mmol/L


 


Potassium   (3.6-5.2)  mmol/L


 


Chloride   (100-108)  mmol/L


 


Carbon Dioxide   (21-32)  mmol/L


 


Anion Gap   (5.0-14.0)  mmol/L


 


BUN   (7-18)  mg/dL


 


Creatinine   (0.8-1.3)  mg/dL


 


Est Cr Clr Drug Dosing   mL/min


 


Estimated GFR (MDRD)   (>60)  


 


Glucose   ()  mg/dL


 


Lactic Acid  2.8 H  (0.4-2.0)  mmol/L


 


Calcium   (8.5-10.1)  mg/dL


 


Total Bilirubin   (0.2-1.0)  mg/dL


 


AST   (15-37)  U/L


 


ALT   (12-78)  U/L


 


Alkaline Phosphatase   ()  U/L


 


Troponin I   (0.000-0.056)  ng/mL


 


Total Protein   (6.4-8.2)  g/dL


 


Albumin   (3.4-5.0)  g/dL


 


Globulin   (2.3-3.5)  g/dL


 


Albumin/Globulin Ratio   (1.2-2.2)  


 


Lipase   ()  U/L


 


Urine Color   


 


Urine Appearance   


 


Urine pH   (4.5-8.0)  


 


Ur Specific Gravity   (1.008-1.030)  


 


Urine Protein   (NEGATIVE)  mg/dL


 


Urine Glucose (UA)   (NEGATIVE)  mg/dL


 


Urine Ketones   (NEGATIVE)  mg/dL


 


Urine Occult Blood   (NEGATIVE)  


 


Urine Nitrite   (NEGAITVE)  


 


Urine Bilirubin   (NEGATIVE)  


 


Urine Urobilinogen   (NORMAL)  mg/dL


 


Ur Leukocyte Esterase   (NEGATIVE)  


 


Urine RBC   (0-5)  


 


Urine WBC   (0-5)  


 


Ur Epithelial Cells   


 


Amorphous Sediment   


 


Urine Bacteria   


 


Urine Mucus   











Meds: 


Medications











Generic Name Dose Route Start Last Admin





  Trade Name Freq  PRN Reason Stop Dose Admin


 


Lactated Ringer's  1,000 mls @ 999 mls/hr  04/24/18 06:30  04/24/18 06:41





  Ringers, Lactated  IV   999 mls/hr





  ASDIRECTED JOAN   Administration





     





     





     





     


 


Sodium Chloride  1,000 mls @ 999 mls/hr  04/24/18 07:45  04/24/18 07:48





  Normal Saline  IV   999 mls/hr





  ASDIRECTED JOAN   Administration





     





     





     





     


 


Sodium Chloride  10 ml  04/24/18 06:28  





  Saline Flush  FLUSH   





  ASDIRECTED PRN   





  Keep Vein Open   





     





     





     














Discontinued Medications














Generic Name Dose Route Start Last Admin





  Trade Name Raul  PRN Reason Stop Dose Admin


 


Lorazepam  1 mg  04/24/18 06:45  04/24/18 06:51





  Ativan  IVPUSH  04/24/18 06:46  1 mg





  ONETIME ONE   Administration





     





     





     





     


 


Prochlorperazine Edisylate  5 mg  04/24/18 06:24  04/24/18 06:27





  Compazine  IVPUSH  04/24/18 06:25  5 mg





  ONETIME ONE   Administration





     





     





     





     














Departure





- Departure


Disposition: Home, Self-Care 01


Clinical Impression: 


 Adverse effects of medication, Dehydration








- Discharge Information


Referrals: 


PCP,None [Primary Care Provider] - 


Forms:  ED Department Discharge


Care Plan Goals: 


 stop the percocet , pt feels that the gabapentin will be adequate for pain 

control,  rtc if further problems. 





- My Orders


Last 24 Hours: 


My Active Orders





04/24/18 07:45


Sodium Chloride 0.9% [Normal Saline] 1,000 ml IV ASDIRECTED 














- Assessment/Plan


Last 24 Hours: 


My Active Orders





04/24/18 07:45


Sodium Chloride 0.9% [Normal Saline] 1,000 ml IV ASDIRECTED 














<Ml Rowell - Last Filed: 04/24/18 08:58>





Course





- Re-Assessments/Exams


Free Text/Narrative Re-Assessment/Exam: 





04/24/18 07:50


pt is doing much btter after the ativan and compazine. He will be given a 

second liter of fluid. He will be tried on some clear liquids and see if he can 

tolerate them. I believe the percocet may be the cause of the nausea. 


04/24/18 08:55


I did discuss with the pt  regarding his pain. He does not feel he needs 

further narcotics.  He will use the gabapentin for pain. 





Departure





- Departure


Time of Disposition: 08:56


Condition: Fair

## 2018-05-01 ENCOUNTER — HOSPITAL ENCOUNTER (EMERGENCY)
Dept: HOSPITAL 11 - JP.ED | Age: 58
LOS: 1 days | Discharge: HOME | End: 2018-05-02
Payer: MEDICAID

## 2018-05-01 VITALS — SYSTOLIC BLOOD PRESSURE: 118 MMHG | DIASTOLIC BLOOD PRESSURE: 71 MMHG

## 2018-05-01 DIAGNOSIS — E11.9: ICD-10-CM

## 2018-05-01 DIAGNOSIS — I10: ICD-10-CM

## 2018-05-01 DIAGNOSIS — Z88.5: ICD-10-CM

## 2018-05-01 DIAGNOSIS — Z88.2: ICD-10-CM

## 2018-05-01 DIAGNOSIS — T81.31XA: Primary | ICD-10-CM

## 2018-05-01 DIAGNOSIS — Z79.4: ICD-10-CM

## 2018-05-01 DIAGNOSIS — I25.2: ICD-10-CM

## 2018-05-01 DIAGNOSIS — E78.00: ICD-10-CM

## 2018-05-01 DIAGNOSIS — Z79.82: ICD-10-CM

## 2018-05-01 NOTE — EDM.PDOC
ED HPI GENERAL MEDICAL PROBLEM





- General


Chief Complaint: Skin Complaint


Stated Complaint: BLEEDING FROM INCISION


Time Seen by Provider: 05/01/18 22:49


Source of Information: Reports: Patient, Family, RN Notes Reviewed


History Limitations: Reports: No Limitations





- History of Present Illness


INITIAL COMMENTS - FREE TEXT/NARRATIVE: 





58-year-old gentleman presents to the emergency department today with complaint 

of bleeding from his incision site, recently underwent lumbar surgery anterior 

approach clean dry and intact capillary intact however he is draining dark red 

blood from the lower portion of the incision site, no other complaints


Treatments PTA: Reports: Dressing(s)





- Related Data


 Allergies











Allergy/AdvReac Type Severity Reaction Status Date / Time


 


hydrocodone Allergy  Nausea and Verified 04/24/18 06:02





   Vomiting  


 


oxycodone Allergy  Nausea and Verified 05/01/18 22:15





   Vomiting  


 


morphine AdvReac  Nausea and Verified 04/17/18 09:02





   Vomiting  


 


Sulfa (Sulfonamide AdvReac  Nausea and Verified 04/17/18 09:02





Antibiotics)   Vomiting  











Home Meds: 


 Home Meds





Aspirin [Halfprin] 81 mg PO DAILY 03/10/15 [History]


Gabapentin [Neurontin] 600 mg PO QID 03/10/15 [History]


Insulin Glarg,Human.Rec.Analog [Lantus] 20 unit SQ BID 03/10/15 [History]


Cyclobenzaprine [Flexeril] 10 mg PO TID PRN 12/07/17 [History]


Insulin Glargine,Hum.Rec.Anlog [Basaglar Kwikpen U-100] 20 unit SQ Q12HR 12/07/ 17 [History]


Ticagrelor [Brilinta] 90 mg PO BID 12/07/17 [History]


Docusate Sodium [Colace] 100 mg PO BID #100 cap 04/19/18 [Rx]











Past Medical History


Cardiovascular History: Reports: CAD, High Cholesterol, Hypertension, MI, Other 

(See Below)


Other Cardiovascular History: CABG X3 IN 2015


Gastrointestinal History: Reports: Cholelithiasis


Musculoskeletal History: Reports: Back Pain, Chronic


Neurological History: Reports: Concussion


Psychiatric History: Reports: Bipolar


Endocrine/Metabolic History: Reports: Diabetes, Type II, IDDM





- Infectious Disease History


Infectious Disease History: Reports: Chicken Pox





- Past Surgical History


HEENT Surgical History: Reports: Adenoidectomy, Myringotomy w Tube(s)


Cardiovascular Surgical History: Reports: Coronary Artery Bypass, Coronary 

Artery Stent


GI Surgical History: Reports: Cholecystectomy, Hernia, Abdominal


Male  Surgical History: Reports: Circumcision, Vasectomy


Endocrine Surgical History: Reports: None


Neurological Surgical History: Reports: Lumbar Spine


Musculoskeletal Surgical History: Reports: Arthroscopic Knee, Knee Replacement, 

Shoulder Surgery, Other (See Below)


Other Musculoskeletal Surgeries/Procedures:: back surgery, multiple  left leg 

surgery, left and right shoulder surgerys. recent L2,L3,L4 fussion through abd 

was just discharged on thursday





Social & Family History





- Family History


Family Medical History: Noncontributory


Cardiac: Reports: Heart Failure, MI


Neurological: Reports: None


Oncologic: Reports: Pancreatic





- Tobacco Use


Smoking Status *Q: Never Smoker


Second Hand Smoke Exposure: No





- Caffeine Use


Caffeine Use: Reports: None


Other Caffeine Use: diet





- Alcohol Use


Days Per Week of Alcohol Use: 0





- Recreational Drug Use


Recreational Drug Use: Yes


Recreational Drug Type: Reports: Marijuana/Hashish


Recreational Drug Use Frequency: Rarely





ED ROS GENERAL





- Review of Systems


Review Of Systems: See Below


Constitutional: Reports: No Symptoms


HEENT: Reports: No Symptoms


Respiratory: Reports: No Symptoms


Cardiovascular: Reports: No Symptoms


GI/Abdominal: Reports: No Symptoms


: Reports: No Symptoms


Skin: Reports: Wound





ED EXAM, SKIN/RASH


Exam: See Below


Exam Limited By: No Limitations


General Appearance: Alert, WD/WN, No Apparent Distress


GI/Abdominal: Soft, Non-Tender


Skin: Warm, Dry, Intact, Other (Surgical wounds clean dry intact dark red blood 

present and draining of the surgical wound)





Course





- Vital Signs


Last Recorded V/S: 


 Last Vital Signs











Temp  99.5 F   05/01/18 22:13


 


Pulse  121 H  05/01/18 22:13


 


Resp  16   05/01/18 22:13


 


BP  118/71   05/01/18 22:13


 


Pulse Ox  96   05/01/18 22:13














- Orders/Labs/Meds


Labs: 


 Laboratory Tests











  05/01/18 05/01/18 05/01/18 Range/Units





  22:58 23:05 23:05 


 


WBC  13.6 H    (4.5-11.0)  K/uL


 


RBC  3.88 L    (4.30-5.90)  M/uL


 


Hgb  10.0 L    (12.0-15.0)  g/dL


 


Hct  31.4 L    (40.0-54.0)  %


 


MCV  81    (80-98)  fL


 


MCH  26 L    (27-31)  pg


 


MCHC  32    (32-36)  %


 


Plt Count  498 H    (150-400)  K/uL


 


Neut % (Auto)  78 H    (36-66)  %


 


Lymph % (Auto)  12 L    (24-44)  %


 


Mono % (Auto)  8 H    (2-6)  %


 


Eos % (Auto)  3    (2-4)  %


 


Baso % (Auto)  0    (0-1)  %


 


Sodium   139 L   (140-148)  mmol/L


 


Potassium   4.1   (3.6-5.2)  mmol/L


 


Chloride   103   (100-108)  mmol/L


 


Carbon Dioxide   28   (21-32)  mmol/L


 


Anion Gap   12.1   (5.0-14.0)  mmol/L


 


BUN   15   (7-18)  mg/dL


 


Creatinine   0.9   (0.8-1.3)  mg/dL


 


Est Cr Clr Drug Dosing   89.47   mL/min


 


Estimated GFR (MDRD)   > 60   (>60)  


 


Glucose   215 H   ()  mg/dL


 


Lactic Acid    1.4  (0.4-2.0)  mmol/L


 


Calcium   8.9   (8.5-10.1)  mg/dL














Departure





- Departure


Time of Disposition: 23:58


Disposition: Home, Self-Care 01


Condition: Good


Clinical Impression: 


Surgical wound breakdown


Qualifiers:


 Encounter type: initial encounter Qualified Code(s): T81.31XA - Disruption of 

external operation (surgical) wound, not elsewhere classified, initial encounter








- Discharge Information


Referrals: 


Jani Crandall MD [Primary Care Provider] - 


Forms:  ED Department Discharge


Additional Instructions: 


Please report to Dr. Suazo's clinic tomorrow morning at 10 AM, call or return 

to the emergency department worsening of symptoms





- Assessment/Plan


Plan: 





Assessment





Acuity = acute





Site and laterality = bleeding from surgical incision site  





Etiology  = unclear etiology  





Manifestations = none  





Location of injury =  Home





Lab values = WBC elevated at 13.6 consistent leukocytosis, hemoglobin low at 

10.0 consistent with normochromic anemia lactic acid normal at 1.4  





Plan


Called and discussed the case with Dr. Suazo general surgery, he agreed to 

evaluate the patient in his clinic tomorrow morning he has appointment at 10:00 

at which time the wound will be opened and explored  

















 This note was dictated using dragon voice recognition software please call 

with any questions on syntax or cesar.

## 2019-03-05 ENCOUNTER — HOSPITAL ENCOUNTER (OUTPATIENT)
Dept: HOSPITAL 11 - JP.SDS | Age: 59
Discharge: HOME | End: 2019-03-05
Attending: SURGERY
Payer: MEDICAID

## 2019-03-05 VITALS — SYSTOLIC BLOOD PRESSURE: 156 MMHG | DIASTOLIC BLOOD PRESSURE: 88 MMHG

## 2019-03-05 DIAGNOSIS — Z88.2: ICD-10-CM

## 2019-03-05 DIAGNOSIS — E11.9: ICD-10-CM

## 2019-03-05 DIAGNOSIS — K29.50: Primary | ICD-10-CM

## 2019-03-05 DIAGNOSIS — R63.4: ICD-10-CM

## 2019-03-05 DIAGNOSIS — Z88.5: ICD-10-CM

## 2019-03-05 DIAGNOSIS — Z88.6: ICD-10-CM

## 2019-03-05 PROCEDURE — 87081 CULTURE SCREEN ONLY: CPT

## 2019-03-05 PROCEDURE — 43239 EGD BIOPSY SINGLE/MULTIPLE: CPT

## 2019-03-11 NOTE — OR
DATE OF PROCEDURE:  03/05/2019

 

PREOPERATIVE DIAGNOSIS:  Nausea, bloating, epigastric discomfort, and weight loss.

 

POSTOPERATIVE DIAGNOSES:

1. Nausea, bloating, epigastric discomfort, and weight loss.

2. Large amount of retained bile in the stomach associated with mild antral gastritis.

 

PROCEDURE:  Esophagogastroduodenoscopy with antral biopsies for CLOtest.

 

ANESTHESIA:  IV sedation.

 

INDICATION FOR PROCEDURE:  This is a 59-year-old with a longstanding history of type 2

diabetes, presenting with progressive problems with nausea, bloating, and weight loss along

with some epigastric discomfort.  He presently is not on any antisecretory medications.  The

plan is to proceed with upper GI endoscopy with biopsies as indicated.  Potential risks

including bleeding and perforation were discussed, and the patient wishes to proceed.

 

DETAILS OF PROCEDURE:  The patient was taken to the operating room and then placed in the

left lateral decubitus position.  IV sedation was administered after which the upper GI

endoscope was passed orally through the length of the esophagus and the stomach with

retroflexion view of the fundus, thereafter through the pyloric channel, and the duodenum to

the junction of the 3rd and 4th duodenal segments.

 

FINDINGS:  Included normal hypopharynx, larynx, upper esophageal sphincter, and esophageal

body.  At the EG junction, no significant hiatal hernia was noted, nor was there significant

inflammation.  There was no upward extension of the gastroesophageal junction mucosal line

above the upper gastric folds.  Within the stomach, there was a fairly large amount of

retained bile suggestive of some possibility of gastroparesis.  Roughly, 60 mL of waste

material was evacuated.  Apart from that, there was some patchy redness in the antrum

without erosions or ulcers.  The pyloric channel and the visualized portions of the duodenum

were unremarkable.  At this point, biopsies were obtained from the antrum and sent for

CLOtest for H. pylori.  Minimal bleeding from the biopsy sites was seen, and the procedure

was then concluded.

 

We will schedule the patient for nuclear medicine gastric emptying study to evaluate what

extent he has significant gastroparesis, although thereafter separate Gastroenterology

consult in [facility] in [place] regarding the bloating

and weight loss and underlying history of type 2 diabetes mellitus, and then we will have

him follow up with Dr. Crandall after the GI consult is completed to pickup ongoing care.

 

We will initiate the patient on Protonix 40 mg p.o. daily in the interim.

 

 

 

Henrry Suazo MD

DD:  03/10/2019 19:58:19

DT:  03/11/2019 15:14:47

Job #:  [id number]

## 2019-10-28 ENCOUNTER — HOSPITAL ENCOUNTER (OUTPATIENT)
Dept: HOSPITAL 11 - JP.SDS | Age: 59
Setting detail: OBSERVATION
LOS: 2 days | Discharge: HOME | End: 2019-10-30
Attending: SURGERY | Admitting: SURGERY
Payer: MEDICAID

## 2019-10-28 DIAGNOSIS — Z91.09: ICD-10-CM

## 2019-10-28 DIAGNOSIS — I25.10: ICD-10-CM

## 2019-10-28 DIAGNOSIS — E11.43: ICD-10-CM

## 2019-10-28 DIAGNOSIS — Z79.4: ICD-10-CM

## 2019-10-28 DIAGNOSIS — N18.3: ICD-10-CM

## 2019-10-28 DIAGNOSIS — Z95.5: ICD-10-CM

## 2019-10-28 DIAGNOSIS — Z79.899: ICD-10-CM

## 2019-10-28 DIAGNOSIS — E11.40: ICD-10-CM

## 2019-10-28 DIAGNOSIS — E78.5: ICD-10-CM

## 2019-10-28 DIAGNOSIS — I12.9: ICD-10-CM

## 2019-10-28 DIAGNOSIS — Z88.2: ICD-10-CM

## 2019-10-28 DIAGNOSIS — E11.22: ICD-10-CM

## 2019-10-28 DIAGNOSIS — I25.2: ICD-10-CM

## 2019-10-28 DIAGNOSIS — Z88.5: ICD-10-CM

## 2019-10-28 DIAGNOSIS — K40.30: Primary | ICD-10-CM

## 2019-10-28 DIAGNOSIS — K31.84: ICD-10-CM

## 2019-10-28 PROCEDURE — 88305 TISSUE EXAM BY PATHOLOGIST: CPT

## 2019-10-28 PROCEDURE — 36415 COLL VENOUS BLD VENIPUNCTURE: CPT

## 2019-10-28 PROCEDURE — 94762 N-INVAS EAR/PLS OXIMTRY CONT: CPT

## 2019-10-28 PROCEDURE — 83735 ASSAY OF MAGNESIUM: CPT

## 2019-10-28 PROCEDURE — C1713 ANCHOR/SCREW BN/BN,TIS/BN: HCPCS

## 2019-10-28 PROCEDURE — 49507 PRP I/HERN INIT BLOCK >5 YR: CPT

## 2019-10-28 PROCEDURE — C1781 MESH (IMPLANTABLE): HCPCS

## 2019-10-28 PROCEDURE — 82962 GLUCOSE BLOOD TEST: CPT

## 2019-10-28 PROCEDURE — 85027 COMPLETE CBC AUTOMATED: CPT

## 2019-10-28 PROCEDURE — 83880 ASSAY OF NATRIURETIC PEPTIDE: CPT

## 2019-10-28 PROCEDURE — 80053 COMPREHEN METABOLIC PANEL: CPT

## 2019-10-28 PROCEDURE — 84100 ASSAY OF PHOSPHORUS: CPT

## 2019-10-28 PROCEDURE — 64772 INCISION OF SPINAL NERVE: CPT

## 2019-10-28 RX ADMIN — INSULIN GLARGINE SCH UNITS: 100 INJECTION, SOLUTION SUBCUTANEOUS at 22:14

## 2019-10-28 RX ADMIN — INSULIN LISPRO PRN UNITS: 100 INJECTION, SOLUTION INTRAVENOUS; SUBCUTANEOUS at 11:42

## 2019-10-28 RX ADMIN — ONDANSETRON PRN MG: 2 INJECTION, SOLUTION INTRAMUSCULAR; INTRAVENOUS at 19:35

## 2019-10-28 RX ADMIN — INSULIN LISPRO PRN UNITS: 100 INJECTION, SOLUTION INTRAVENOUS; SUBCUTANEOUS at 22:15

## 2019-10-28 RX ADMIN — ONDANSETRON PRN MG: 2 INJECTION, SOLUTION INTRAMUSCULAR; INTRAVENOUS at 11:33

## 2019-10-28 RX ADMIN — ONDANSETRON PRN MG: 2 INJECTION, SOLUTION INTRAMUSCULAR; INTRAVENOUS at 15:21

## 2019-10-29 RX ADMIN — ONDANSETRON PRN MG: 2 INJECTION, SOLUTION INTRAMUSCULAR; INTRAVENOUS at 01:12

## 2019-10-29 RX ADMIN — INSULIN LISPRO PRN UNITS: 100 INJECTION, SOLUTION INTRAVENOUS; SUBCUTANEOUS at 17:58

## 2019-10-29 RX ADMIN — INSULIN GLARGINE SCH UNITS: 100 INJECTION, SOLUTION SUBCUTANEOUS at 21:55

## 2019-10-29 RX ADMIN — INSULIN LISPRO PRN UNITS: 100 INJECTION, SOLUTION INTRAVENOUS; SUBCUTANEOUS at 09:30

## 2019-10-29 RX ADMIN — INSULIN LISPRO PRN UNITS: 100 INJECTION, SOLUTION INTRAVENOUS; SUBCUTANEOUS at 11:37

## 2019-10-29 RX ADMIN — ONDANSETRON PRN MG: 2 INJECTION, SOLUTION INTRAMUSCULAR; INTRAVENOUS at 21:29

## 2019-10-29 RX ADMIN — Medication SCH: at 09:28

## 2019-10-30 VITALS — SYSTOLIC BLOOD PRESSURE: 118 MMHG | DIASTOLIC BLOOD PRESSURE: 55 MMHG | HEART RATE: 80 BPM

## 2019-10-30 RX ADMIN — Medication SCH: at 08:17

## 2019-10-30 RX ADMIN — INSULIN LISPRO PRN UNITS: 100 INJECTION, SOLUTION INTRAVENOUS; SUBCUTANEOUS at 09:11

## 2019-10-30 NOTE — DISCH
ADMISSION DIAGNOSES:

1. Left inguinal hernia.

2. Coronary artery disease.

3. Dyslipidemia.

4. Coronary artery bypass graft.

5. Essential hypertension.

6. Congenital spinal stenosis.

7. Uncontrolled insulin-dependent diabetes with neuropathy.

8. Non-ST segment elevation myocardial infarction.

9. Status post lumbar fusion.

10.Diabetic gastroparesis.

 

DISCHARGE DIAGNOSIS:  Left inguinal exploration with repair of incisional left inguinal

hernia with mesh and excision of portion of the left ilioinguinal nerve.

 

POSTOPERATIVE DIAGNOSES:

1. Incarcerated left inguinal hernia.

2. Left ilioinguinal nerve at risk for scar entrapment.  Date of surgery:  10/28/2019.

    Surgeon:  Henrry Suazo MD.

 

HISTORY:  Mohamud Bowman had his surgery on 10/28/2019.  He had postoperative nausea and

vomiting.  He was started on Reglan, scopolamine patch, and was given Zofran.  On

postoperative day #1, he continued to have nausea and vomiting.  He was able to tolerate 790

of fluids.  He had 1430 mL emesis and urine output was 2150.  On 10/30/2019, his oral intake

was better and he did have 600 mL of emesis and he did throw up 4 times.  Urine output was

2100.  He reports that he feels once he gets home, he will be feeling better.  His activity

is good.  Vital signs have been stable.

 

REVIEW OF SYSTEMS:  Remainder of review of systems negative for any pertinent positives and

negatives.

 

OBJECTIVE:  GENERAL:  Mohamud Bowman is a 59-year-old male.

VITAL SIGNS:  Height is 5 feet 9 inches, weight is 168 pounds.  TPR is 98.1, 80, 16, blood

pressure 118/55.

HEENT:  Negative.

NECK:  Supple.

HEART:  Regular rate and rhythm.

LUNGS:  Clear.

ABDOMEN:  Dressings at left inguinal hernia is dry and intact.

EXTREMITIES:  Without peripheral edema.

 

DISPOSITION:  Discharged to home.

 

CONDITION:  Stable and improving.

 

FOLLOWUP:  Appointment with Sapna Darling PA-C, on __________ at 9 a.m.

 

HOME MEDICATIONS:

1. Dilaudid 2 mg every 6 hours p.r.n. pain, #28.

2. Reglan 10 mg p.o. before meals and at bedtime, #120.

3. Zofran 4 mg ODT sublingual q.4 hours p.r.n. nausea, #30.

4. Azithromycin 125 mg p.o. q.24 hours, #30.

 

He is to resume his home medications of;

1. Lantus 20 units subcu bedtime.

2. Amlodipine 10 mg p.o. daily.

3. Lopressor 25 mg p.o. b.i.d.

4. Extra Strength Tylenol 1000 mg q.6 hours p.r.n. pain.

 

DIET:  Mechanical soft diet.  8 to 10 glasses of water a day.

 

ACTIVITY:  No lifting greater than 10 pounds for 6 weeks.  Other activity is walk at least 6

times daily, distance and time as tolerated.  Driving:  Do not drive for 1 week and while on

pain medication.  Shower/bathing:  May shower.  Keep operative site clean and dry.

 

DISCHARGE INSTRUCTIONS:  Notify provider if any fever, increased pain, nausea, or vomiting.

 

SPECIAL INSTRUCTIONS:

1. Use incentive spirometer 10 times every hour while awake.

2. Remove scopolamine patch which is for nausea behind his ear on Friday night, November 1, 2019.

## 2019-10-31 NOTE — PN
DATE OF SERVICE:  10/29/2019

 

Overnight, the patient had ongoing nausea and vomiting.  The patient does have a notable

history of diabetic gastroparesis, and we did add some Reglan last night, along with Zofran.

We will give him some Zithromax today to augment the gastric emptying as well, and we will

try to restart the diet during the day.  He will need to be switched to inpatient status

because of this issue.  Otherwise, we will work on pulmonary toilet and get him up and

moving as much as possible today.  We will recheck some labs tomorrow morning.

 

 

 

 

Henrry Suazo MD

DD:  10/29/2019 07:23:56

DT:  10/31/2019 10:25:44

Job #:  [id number]

## 2019-11-08 NOTE — OR
DATE OF PROCEDURE:  10/28/2019

 

SURGEON:  Henrry Suazo MD

 

PREOPERATIVE DIAGNOSIS:  Incarcerated left inguinal hernia.

 

POSTOPERATIVE DIAGNOSIS:

1. Incarcerated left inguinal hernia.

2. Left ilioinguinal nerve at risk for scar entrapment.

 

OPERATIVE PROCEDURE:  Left inguinal exploration with:

1. Repair of incarcerated left inguinal hernia with mesh ([id number]).

2. Excision of portion of left ilioinguinal nerve ([id number]).

 

ANESTHESIA:  Local plus IV sedation.

 

INDICATION FOR PROCEDURE:  This is a 59-year-old presenting with worsening symptomatic left

inguinal hernia.  This is often noted to contain what would likely be sigmoid colon and at

times has been difficult to reduce, associated with some nausea and discomfort in that area

when it is fully descended down into the hernia sac.  The plan is to proceed with an open

repair of this with a mesh plug technique.  We will use local plus IV sedation.  His current

medical status is quite tenuous, but I think he should, in all likelihood, tolerate this

procedure reasonably well, whereas leaving this in place would put the patient at risk for

large bowel obstruction and possible necrosis, which would not be tolerated in this case.

Potential risks of the procedure per se including bleeding, infection, injury to underlying

viscera, problems with hernia recurring and the mesh becoming infected as well as the

possibility of cardiopulmonary, septic, or hemorrhagic complications leading to death were

all discussed, and the patient wishes to proceed.  Additionally, that we will often divide

the area of the ilioinguinal nerve on this side of the procedure so as to avoid

postoperative neuropathic pain was gone over, and the patient likewise wishes to proceed.

 

DETAILS OF PROCEDURE:  The patient was taken to the operating room and placed in a supine

position.  After IV sedation was administered, the abdomen and groin areas were prepped and

draped.  The left inguinal area was anesthetized with 1% lidocaine with Marcaine, and a

standard left inguinal excision was made and carried down through the skin and subcutaneous

tissue to the external oblique aponeurosis.  Subaponeurotic flaps were then raised

superiorly and inferiorly so that the ilioinguinal nerve would lay across the location of

the flat portion of the mesh plug system.  This was divided in the medial aspect of the

inguinal floor and dissected free, and the far lateral aspect of the incision where it was

divided, and that portion of the nerve was excised.

 

The cord structures were then mobilized upward after subfascial flaps were raised superiorly

and medially, and as the cord structures were mobilized upward, the patient was noted to

have a large indirect hernia.  This contained some palpable viscera, which in this case

would almost certainly be sigmoid colon.  This was gradually reduced with some teasing of

the bowel wall away from the hernia sac and the bowel then inspected after the hernia sac

was opened and found to be intact and was able to be reduced back in.  Visual confirmation

did show this to be sigmoid colon.  The hernia sac was then re-tied and then dissected down

to the level of the internal ring, where it was easily turned inward.  A large mesh plus was

placed in the defect.  This was fixed medially to Willi ligament with some titanium tacking

screws and the underside of the conjoined tendon medially, superiorly, and laterally with

horizontal mattress sutures of 0 Vicryl stitch.  A similar stitch was then placed at the

shelving portion of the inguinal ligament just lateral to the cord structures.  Following

this, the flat portion of the mesh plug system was placed around the cord structures,

sutured laterally with 3-0 Vicryl, and then affixed to the pubic tubercle with titanium

tacking screw.  The cord structures were placed over this and the external oblique

aponeurosis approximated with some 3-0 Vicryl stitch along with Rohit's fascia and the skin

closed with 4-0 Vicryl subcuticular stitch.  Dressing was applied.  The patient tolerated

the procedure well and was taken to the recovery room in satisfactory condition.  There were

no evident complications.

 

Physician assistant, Jordyn Darling, placed an essential role in assisting in this case,

helping to position the patient, retracting structures as needed, as well as suturing and

cutting sutures when indicated.  Her presence improved patient safety and decreased

operative time.

 

 

 

 

Henrry Suazo MD

DD:  11/06/2019 20:11:51

DT:  11/08/2019 12:41:48

Job #:  [id number]

## 2021-04-05 ENCOUNTER — HOSPITAL ENCOUNTER (EMERGENCY)
Dept: HOSPITAL 11 - JP.ED | Age: 61
Discharge: LEFT BEFORE DISCHARGE | End: 2021-04-05
Payer: MEDICAID

## 2021-04-05 VITALS — DIASTOLIC BLOOD PRESSURE: 113 MMHG | HEART RATE: 80 BPM | SYSTOLIC BLOOD PRESSURE: 198 MMHG

## 2021-04-05 DIAGNOSIS — Z86.73: ICD-10-CM

## 2021-04-05 DIAGNOSIS — I25.2: ICD-10-CM

## 2021-04-05 DIAGNOSIS — Z79.899: ICD-10-CM

## 2021-04-05 DIAGNOSIS — Z88.5: ICD-10-CM

## 2021-04-05 DIAGNOSIS — Z91.048: ICD-10-CM

## 2021-04-05 DIAGNOSIS — E78.00: ICD-10-CM

## 2021-04-05 DIAGNOSIS — Z79.4: ICD-10-CM

## 2021-04-05 DIAGNOSIS — I10: Primary | ICD-10-CM

## 2021-04-05 DIAGNOSIS — F41.9: ICD-10-CM

## 2021-04-05 DIAGNOSIS — I25.10: ICD-10-CM

## 2021-04-05 DIAGNOSIS — Z88.2: ICD-10-CM

## 2021-04-05 DIAGNOSIS — Z95.1: ICD-10-CM

## 2021-04-05 DIAGNOSIS — E11.9: ICD-10-CM

## 2021-04-05 DIAGNOSIS — Z95.5: ICD-10-CM

## 2021-04-05 NOTE — EDM.PDOC
ED HPI GENERAL MEDICAL PROBLEM





- General


Chief Complaint: Cardiovascular Problem


Stated Complaint: HEART TROUBLE


Time Seen by Provider: 21 10:23


Source of Information: Reports: Patient, Old Records, RN


History Limitations: Reports: Other (poor historian)





- History of Present Illness


INITIAL COMMENTS - FREE TEXT/NARRATIVE: 





60 yo male presents with 3 days of "chest pain" and SOB. Has chronic chest pain 

and simultaneous numbness he states since his previous CVA. Sx's have been 

constant for the past 3 days and are not worse. No fever. No self tx. Does not 

live alone. SOB worse with walking. Pain not worse with breathing. 


Onset: Gradual


Onset Date: 21


Duration: Day(s): (3), Constant


Location: Reports: Chest


Quality: Reports: Other (numb with chronic pain x always, ? worse today. )


Severity: Moderate


Improves with: Reports: None


Worsens with: Reports: Other (? exertion)


Context: Reports: Other (See HPI)


Associated Symptoms: Reports: Chest Pain, Shortness of Breath.  Denies: 

Diaphoresis, Fever/Chills, Nausea/Vomiting, Rash


Treatments PTA: Reports: Other (see below) (none)





- Related Data


                                    Allergies











Allergy/AdvReac Type Severity Reaction Status Date / Time


 


grass pollen Allergy  Rash Verified 10/24/19 13:30


 


Sulfa (Sulfonamide Allergy  Itching Verified 10/25/19 13:02





Antibiotics)     


 


hydrocodone AdvReac  Nausea and Verified 10/25/19 13:02





   Vomiting  


 


morphine AdvReac  Nausea and Verified 19 06:25





   Vomiting  


 


oxycodone AdvReac  Nausea and Verified 10/25/19 13:02





   Vomiting  


 


tramadol AdvReac  Nausea and Verified 10/25/19 13:02





   Vomiting  


 


hamsters Allergy  Rash Uncoded 10/24/19 13:30











Home Meds: 


                                    Home Meds





Insulin Glarg,Human.Rec.Analog [Lantus] 20 unit SQ BEDTIME 03/10/15 [History]


Metoprolol Tartrate [Lopressor] 25 mg PO BID 10/28/19 [History]


amLODIPine Besylate [Amlodipine Besylate] 10 mg PO DAILY 10/28/19 [History]


Acetaminophen [Tylenol Extra Strength] 1,000 mg PO Q6H  tablet 10/30/19 [Rx]


Azithromycin 125 mg PO Q24H #30 tablet 10/30/19 [Rx]


HYDROmorphone [Dilaudid] 2 mg PO Q6H PRN #28 tablet 10/30/19 [Rx]


Metoclopramide [Reglan] 10 mg PO Q6H #120 tab 10/30/19 [Rx]


Ondansetron [Ondansetron ODT] 4 mg PO Q4H PRN #30 tab.rapdis 10/30/19 [Rx]











Past Medical History


HEENT History: Reports: None, Otitis Media


Cardiovascular History: Reports: CAD, High Cholesterol, Hypertension, MI, 

Stents, Other (See Below)


Other Cardiovascular History: CABG X3 IN 


Gastrointestinal History: Reports: Cholelithiasis, Other (See Below)


Other Gastrointestinal History: severe nausea


Genitourinary History: Reports: None


Musculoskeletal History: Reports: Arthritis, Back Pain, Chronic, Fracture, 

Osteoarthritis


Neurological History: Reports: Concussion, CVA, Other (See Below)


Other Neuro History: CVA 


Psychiatric History: Reports: Bipolar, Psych Hospitalization(s)


Endocrine/Metabolic History: Reports: Diabetes, Type II, IDDM


Hematologic History: Reports: Blood Transfusion(s)





- Infectious Disease History


Infectious Disease History: Reports: Chicken Pox, Measles, Mumps, Rubella





- Past Surgical History


HEENT Surgical History: Reports: Adenoidectomy, Myringotomy w Tube(s), Oral 

Surgery, Other (See Below)


Other HEENT Surgeries/Procedures: snap in dentures


Cardiovascular Surgical History: Reports: Coronary Artery Bypass


GI Surgical History: Reports: Cholecystectomy, Colonoscopy, EGD, Hernia, A

bdominal


Male  Surgical History: Reports: Vasectomy


Endocrine Surgical History: Reports: None


Neurological Surgical History: Reports: Laminectomy, Spinal Fusion


Musculoskeletal Surgical History: Reports: Joint Replacement, Knee Replacement, 

Shoulder Surgery


Other Musculoskeletal Surgeries/Procedures:: back surgery, multiple  left leg 

surgery, left and right shoulder surgery, recent L2,L3,L4 fussion through abd 

was just discharged on thursday; right toes amputation ; left ankle 

fracture


Dermatological Surgical History: Reports: None





Social & Family History





- Family History


Family Medical History: No Pertinent Family History


Cardiac: Reports: Heart Failure, MI, Other (See Below)


Other Cardiac Family History: mother  r/t sepsis after hernia repair


Musculoskeletal: Reports: Back pain, Chronic


Neurological: Reports: None


Endocrine/Metabolic: Reports: Diabetes, type II


Oncologic: Reports: Pancreatic





- Caffeine Use


Caffeine Use: Reports: Coffee


Other Caffeine Use: diet





ED ROS GENERAL





- Review of Systems


Review Of Systems: See Below


Constitutional: Reports: No Symptoms


HEENT: Reports: No Symptoms


Respiratory: Reports: Shortness of Breath.  Denies: Cough, Sputum, Hemoptysis


Cardiovascular: Reports: Chest Pain


GI/Abdominal: Reports: No Symptoms


: Reports: No Symptoms


Musculoskeletal: Reports: No Symptoms


Skin: Reports: No Symptoms


Neurological: Reports: Numbness (chest)


Psychiatric: Reports: No Symptoms





ED EXAM, GENERAL





- Physical Exam


Exam: See Below


Exam Limited By: No Limitations


General Appearance: Alert, WD/WN, No Apparent Distress, Anxious


Eye Exam: Bilateral Eye: Normal Inspection


Ears: Normal External Exam, Normal Canal, Hearing Grossly Normal


Ear Exam: Bilateral Ear: Auricle Normal, Canal Normal


Nose: Normal Inspection, No Blood


Throat/Mouth: Normal Inspection, Normal Lips, Normal Oropharynx, Normal Voice, 

No Airway Compromise


Head: Atraumatic, Normocephalic


Neck: Normal Inspection


Respiratory/Chest: No Respiratory Distress, Lungs Clear, Normal Breath Sounds, 

No Accessory Muscle Use


Cardiovascular: Regular Rate, Rhythm, No Edema


GI/Abdominal: Normal Bowel Sounds, Soft, Non-Tender, No Distention


Extremities: Normal Inspection


Neurological: Alert, Oriented, CN II-XII Intact, Normal Cognition, No 

Motor/Sensory Deficits


Psychiatric: Normal Affect, Normal Mood


Skin Exam: Warm, Dry, Intact, Normal Color, No Rash, Other (old sternal scar pr

esent)


  ** #1 Interpretation


EKG Date: 21


Time: 10:20


Rhythm: NSR


Rate (Beats/Min): 76


Axis: Normal


P-Wave: Present


QRS: LBBB


ST-T: Normal


QT: Normal


Comparison: Change From Previous EKG (T waves flipped in I, V4-V6 compared with 

3/29/18)





Course





- Vital Signs


Last Recorded V/S: 


                                Last Vital Signs











Temp  36.5 C   21 10:24


 


Pulse  80   21 10:24


 


Resp  16   21 10:24


 


BP  198/113 H  21 10:24


 


Pulse Ox  99   21 10:24














- Orders/Labs/Meds


Orders: 


                               Active Orders 24 hr











 Category Date Time Status


 


 BASIC METABOLIC PANEL,BMP [CHEM] Stat Lab  21 10:25 Received


 


 TROPONIN I [CHEM] Stat Lab  21 10:25 Received


 


 Saline Lock Insert [OM.PC] Routine Oth  21 10:18 Ordered


 


 EKG 12 Lead [EK] Routine Ther  21 10:17 Ordered











Labs: 


                                Laboratory Tests











  21 Range/Units





  10:25 


 


WBC  10.8  (4.5-11.0)  K/uL


 


RBC  5.17  (4.30-5.90)  M/uL


 


Hgb  13.5  D  (12.0-15.0)  g/dL


 


Hct  41.4  (40.0-54.0)  %


 


MCV  80  (80-98)  fL


 


MCH  26 L  (27-31)  pg


 


MCHC  33  (32-36)  %


 


Plt Count  249  (150-400)  K/uL











Meds: 


Medications














Discontinued Medications














Generic Name Dose Route Start Last Admin





  Trade Name Freq  PRN Reason Stop Dose Admin


 


Aspirin  324 mg  21 10:19 





  Aspirin 81 Mg Tab.Chew  PO  21 10:20 





  ONETIME ONE  


 


Lorazepam  0.5 mg  21 10:27 





  Lorazepam 2 Mg/Ml Sdv  IVPUSH  21 10:28 





  ONETIME ONE  


 


Nitroglycerin  0.4 mg  21 10:26 





  Nitroglycerin 0.4 Mg Tab.Sl  SL  





  Q5M PRN  





  Chest Pain  


 


Sodium Chloride  10 ml  21 10:18 





  Sodium Chloride 0.9% 10 Ml Syringe  FLUSH  





  ASDIRECTED PRN  





  Keep Vein Open  














Departure





- Departure


Time of Disposition: 10:35


Disposition: Against Medical Advice 07


Condition: Fair


Clinical Impression: 


 Anxiety





HTN (hypertension)


Qualifiers:


 Hypertension type: unspecified Qualified Code(s): I10 - Essential (primary) 

hypertension





Referrals: 


PCP,None [Primary Care Provider] - 


Forms:  ED Department Discharge





Sepsis Event Note (ED)





- Focused Exam


Vital Signs: 


                                   Vital Signs











  Temp Pulse Resp BP Pulse Ox


 


 21 10:24  36.5 C  80  16  198/113 H  99














- My Orders


Last 24 Hours: 


My Active Orders





21 10:17


EKG 12 Lead [EK] Routine 





21 10:18


Saline Lock Insert [OM.PC] Routine 





21 10:25


BASIC METABOLIC PANEL,BMP [CHEM] Stat 


TROPONIN I [CHEM] Stat 














- Assessment/Plan


Last 24 Hours: 


My Active Orders





21 10:17


EKG 12 Lead [EK] Routine 





21 10:18


Saline Lock Insert [OM.PC] Routine 





21 10:25


BASIC METABOLIC PANEL,BMP [CHEM] Stat 


TROPONIN I [CHEM] Stat

## 2021-11-18 ENCOUNTER — HOSPITAL ENCOUNTER (EMERGENCY)
Dept: HOSPITAL 11 - JP.ED | Age: 61
Discharge: HOME | End: 2021-11-18
Payer: MEDICAID

## 2021-11-18 VITALS — HEART RATE: 77 BPM | DIASTOLIC BLOOD PRESSURE: 87 MMHG | SYSTOLIC BLOOD PRESSURE: 145 MMHG

## 2021-11-18 DIAGNOSIS — Z88.2: ICD-10-CM

## 2021-11-18 DIAGNOSIS — I25.10: ICD-10-CM

## 2021-11-18 DIAGNOSIS — I11.0: Primary | ICD-10-CM

## 2021-11-18 DIAGNOSIS — Z79.82: ICD-10-CM

## 2021-11-18 DIAGNOSIS — Z91.048: ICD-10-CM

## 2021-11-18 DIAGNOSIS — E11.9: ICD-10-CM

## 2021-11-18 DIAGNOSIS — E78.00: ICD-10-CM

## 2021-11-18 DIAGNOSIS — Z79.899: ICD-10-CM

## 2021-11-18 DIAGNOSIS — M19.90: ICD-10-CM

## 2021-11-18 DIAGNOSIS — I50.41: ICD-10-CM

## 2021-11-18 DIAGNOSIS — I25.2: ICD-10-CM

## 2021-11-18 DIAGNOSIS — Z20.822: ICD-10-CM

## 2021-11-18 DIAGNOSIS — Z86.73: ICD-10-CM

## 2021-11-18 DIAGNOSIS — Z88.5: ICD-10-CM

## 2021-11-18 LAB — SARS-COV-2 RNA RESP QL NAA+PROBE: NEGATIVE

## 2021-11-18 PROCEDURE — 93005 ELECTROCARDIOGRAM TRACING: CPT

## 2021-11-18 PROCEDURE — 85025 COMPLETE CBC W/AUTO DIFF WBC: CPT

## 2021-11-18 PROCEDURE — 71046 X-RAY EXAM CHEST 2 VIEWS: CPT

## 2021-11-18 PROCEDURE — 99285 EMERGENCY DEPT VISIT HI MDM: CPT

## 2021-11-18 PROCEDURE — 96374 THER/PROPH/DIAG INJ IV PUSH: CPT

## 2021-11-18 PROCEDURE — 83880 ASSAY OF NATRIURETIC PEPTIDE: CPT

## 2021-11-18 PROCEDURE — 36415 COLL VENOUS BLD VENIPUNCTURE: CPT

## 2021-11-18 PROCEDURE — 80053 COMPREHEN METABOLIC PANEL: CPT

## 2021-11-18 PROCEDURE — 84484 ASSAY OF TROPONIN QUANT: CPT

## 2021-11-18 PROCEDURE — 0241U: CPT

## 2021-11-18 NOTE — CR
CHEST: 2 view

 

CLINICAL HISTORY:Dyspnea

 

COMPARISON:10/25/2017

 

FINDINGS:  The heart is enlarged. There has been previous sternotomy. Pulmonary

vascularity is mildly cephalized. There is diffuse interstitial prominence.

There may be some minimal fluid in the left major fissure.

 

Impression: Mild cardiomegaly with vascular cephalization and interstitial

prominence may represent some mild CHF with interstitial edema. Pneumonitis is

not excluded

## 2021-11-18 NOTE — EDM.PDOC
ED HPI GENERAL MEDICAL PROBLEM





- General


Chief Complaint: Respiratory Problem


Stated Complaint: CANNOT CATCH BREATHING, FEELS LIKE DROWNING


Time Seen by Provider: 21 10:24


Source of Information: Reports: Patient


History Limitations: Reports: No Limitations





- History of Present Illness


INITIAL COMMENTS - FREE TEXT/NARRATIVE: 


Mohamud is a 61-year-old male presenting to the ED for evaluation of respiratory 

distress.  Patient states that since last night he is felt like he is drowning 

and unable to catch his breath.  The patient is noted to be hyperventilating in 

the room while I am directing with him.  Patient reports that he was told that 

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  He also has a history 

of a stroke leaving him with a right sided decreased sensation and difficulty 

with memory.  He denies any fever or chills.  He has had a cough that is been 

minimally productive.  He denies any chest pain.  He has had nausea without 

vomiting.  His SPO2 is 97% on room air and his respiratory rate is 22.











- Related Data


                                    Allergies











Allergy/AdvReac Type Severity Reaction Status Date / Time


 


grass pollen Allergy Intermediate Rash Verified 21 10:13


 


Sulfa (Sulfonamide Allergy Intermediate Itching Verified 21 10:13





Antibiotics)     


 


hydrocodone AdvReac Intermediate Nausea and Verified 21 10:13





   Vomiting  


 


morphine AdvReac Intermediate Nausea and Verified 21 10:13





   Vomiting  


 


oxycodone AdvReac Intermediate Nausea and Verified 21 10:13





   Vomiting  


 


tramadol AdvReac Intermediate Nausea and Verified 21 10:13





   Vomiting  


 


hamsters Allergy Intermediate Rash Uncoded 21 10:13











Home Meds: 


                                    Home Meds





Metoprolol Tartrate [Lopressor] 25 mg PO BID 10/28/19 [History]


amLODIPine Besylate [Amlodipine Besylate] 10 mg PO DAILY 10/28/19 [History]


Metoclopramide [Reglan] 10 mg PO Q6H #120 tab 10/30/19 [Rx]


Ondansetron [Ondansetron ODT] 4 mg PO Q4H PRN #30 tab.rapdis 10/30/19 [Rx]


Aspirin 81 mg PO DAILY 21 [History]











Past Medical History


HEENT History: Reports: None, Otitis Media


Cardiovascular History: Reports: CAD, High Cholesterol, Hypertension, MI, 

Stents, Other (See Below)


Other Cardiovascular History: CABG X3 IN 


Gastrointestinal History: Reports: Cholelithiasis, Other (See Below)


Other Gastrointestinal History: severe nausea


Genitourinary History: Reports: None


Musculoskeletal History: Reports: Arthritis, Back Pain, Chronic, Fracture, 

Osteoarthritis


Neurological History: Reports: Concussion, CVA, Other (See Below)


Other Neuro History: CVA 


Psychiatric History: Reports: Bipolar, Psych Hospitalization(s)


Endocrine/Metabolic History: Reports: Diabetes, Type II, IDDM


Hematologic History: Reports: Blood Transfusion(s)





- Infectious Disease History


Infectious Disease History: Reports: Chicken Pox, Measles, Mumps, Rubella





- Past Surgical History


HEENT Surgical History: Reports: Adenoidectomy, Myringotomy w Tube(s), Oral 

Surgery, Other (See Below)


Other HEENT Surgeries/Procedures: snap in dentures


Cardiovascular Surgical History: Reports: Coronary Artery Bypass


GI Surgical History: Reports: Cholecystectomy, Colonoscopy, EGD, Hernia, 

Abdominal


Male  Surgical History: Reports: Vasectomy


Endocrine Surgical History: Reports: None


Neurological Surgical History: Reports: Laminectomy, Spinal Fusion


Musculoskeletal Surgical History: Reports: Joint Replacement, Knee Replacement, 

Shoulder Surgery


Other Musculoskeletal Surgeries/Procedures:: back surgery, multiple  left leg 

surgery, left and right shoulder surgery, recent L2,L3,L4 fussion through abd 

was just discharged on thursday; right toes amputation ; left ankle 

fracture


Dermatological Surgical History: Reports: None





Social & Family History





- Family History


Family Medical History: No Pertinent Family History


Cardiac: Reports: Heart Failure, MI, Other (See Below)


Other Cardiac Family History: mother  r/t sepsis after hernia repair


Musculoskeletal: Reports: Back pain, Chronic


Neurological: Reports: None


Endocrine/Metabolic: Reports: Diabetes, type II


Oncologic: Reports: Pancreatic





- Tobacco Use


Tobacco Use Status *Q: Never Tobacco User





- Caffeine Use


Caffeine Use: Reports: Coffee


Other Caffeine Use: diet





- Recreational Drug Use


Recreational Drug Use: Yes


Recreational Drug Type: Reports: Marijuana/Hashish


Recreational Drug Use Frequency: Rarely





ED ROS GENERAL





- Review of Systems


Review Of Systems: See Below


Constitutional: Reports: Weakness (Generalized)


HEENT: Reports: No Symptoms


Respiratory: Reports: Shortness of Breath


Cardiovascular: Reports: No Symptoms


Endocrine: Reports: No Symptoms


GI/Abdominal: Reports: No Symptoms


: Reports: No Symptoms


Musculoskeletal: Reports: No Symptoms


Skin: Reports: No Symptoms


Neurological: Reports: No Symptoms


Psychiatric: Reports: Anxiety


Hematologic/Lymphatic: Reports: No Symptoms


Immunologic: Reports: No Symptoms





ED EXAM, GENERAL





- Physical Exam


Exam: See Below


Exam Limited By: No Limitations


General Appearance: Alert, Anxious, Mild Distress


Eye Exam: Bilateral Eye: EOMI, PERRL


Throat/Mouth: Normal Oropharynx, Normal Voice, No Airway Compromise


Head: Atraumatic, Normocephalic


Neck: Normal Inspection, Supple


Respiratory/Chest: No Respiratory Distress, No Accessory Muscle Use, Chest Non-

Tender, Rhonchi (Scant rhonchi in bases), Other (Tachypnea)


Cardiovascular: Normal Peripheral Pulses, Regular Rate, Rhythm, No Murmur


GI/Abdominal: Normal Bowel Sounds, Soft, Non-Tender


Back Exam: Normal Inspection


Extremities: Normal Inspection


Neurological: Alert, Oriented, Normal Cognition, No Motor/Sensory Deficits


Psychiatric: Normal Affect, Anxious


Skin Exam: Warm, Dry


  ** #1 Interpretation


EKG Date: 21


Time: 10:34


Rhythm: NSR (Occasional premature ventricular contraction.)


Rate (Beats/Min): 83


Axis: LAD-Left Axis Deviation


P-Wave: Present


QRS: Normal (LVH by voltage criteria.  Poor R wave progression in the precordial

 leads.)


ST-T: Other (T wave inversion in lateral leads.)


QT: Prolonged





Course





- Vital Signs


Last Recorded V/S: 


                                Last Vital Signs











Temp  37.0 C   21 14:26


 


Pulse  77   21 16:05


 


Resp  16   21 16:05


 


BP  145/87 H  21 16:05


 


Pulse Ox  96   21 16:05














- Orders/Labs/Meds


Orders: 


                               Active Orders 24 hr











 Category Date Time Status


 


 Sodium Chloride 0.9% [Saline Flush] Med  21 12:25 Active





 10 ml FLUSH ASDIRECTED PRN   


 


 Isolation [COMM] Stat Oth  21 10:57 Ordered


 


 Saline Lock Insert [OM.PC] Routine Oth  21 12:25 Ordered


 


 EKG 12 Lead [EK] Routine Ther  21 10:57 Ordered








                                Medication Orders





Sodium Chloride (Sodium Chloride 0.9% 10 Ml Syringe)  10 ml FLUSH ASDIRECTED PRN


   PRN Reason: Keep Vein Open


   Last Admin: 21 12:47  Dose: 10 ml


   Documented by: [id number]








Labs: 


                                Laboratory Tests











  21 Range/Units





  10:56 11:04 11:05 


 


WBC  11.1 H    (4.5-11.0)  K/uL


 


RBC  4.80    (4.30-5.90)  M/uL


 


Hgb  12.6    (12.0-15.0)  g/dL


 


Hct  38.7 L    (40.0-54.0)  %


 


MCV  81    (80-98)  fL


 


MCH  26 L    (27-31)  pg


 


MCHC  33    (32-36)  %


 


Plt Count  271    (150-400)  K/uL


 


Neut % (Auto)  76.8 H    (36-66)  %


 


Lymph % (Auto)  13.8 L    (24-44)  %


 


Mono % (Auto)  7.7 H    (2-6)  %


 


Eos % (Auto)  1.5 L    (2-4)  %


 


Baso % (Auto)  0.2    (0-1)  %


 


Sodium    146  (140-148)  mmol/L


 


Potassium    4.7  (3.6-5.2)  mmol/L


 


Chloride    110 H  (100-108)  mmol/L


 


Carbon Dioxide    27  (21-32)  mmol/L


 


Anion Gap    13.7  (5.0-14.0)  mmol/L


 


BUN    29 H  (7-18)  mg/dL


 


Creatinine    1.5 H  (0.8-1.3)  mg/dL


 


Est Cr Clr Drug Dosing    48.35  mL/min


 


Estimated GFR (MDRD)    48 L  (>60)  


 


Glucose    142 H  ()  mg/dL


 


Calcium    9.0  (8.5-10.1)  mg/dL


 


Total Bilirubin    0.6  (0.2-1.0)  mg/dL


 


AST    20  (15-37)  U/L


 


ALT    39  D  (12-78)  U/L


 


Alkaline Phosphatase    102  ()  U/L


 


Troponin I     (0.000-0.056)  ng/mL


 


NT-Pro-B Natriuret Pep    8599 H  (5-125)  pg/mL


 


Total Protein    6.6  (6.4-8.2)  g/dL


 


Albumin    3.3 L  (3.4-5.0)  g/dL


 


Globulin    3.3  (2.3-3.5)  g/dL


 


Albumin/Globulin Ratio    1.0 L  (1.2-2.2)  


 


Influenza Type A RNA   Negative   (NEGATIVE)  


 


RSV RNA (INAAT)   Negative   (NEGATIVE)  


 


Influenza Type B RNA   Negative   (NEGATIVE)  


 


SARS-CoV-2 RNA (LEENA)   Negative   (NEGATIVE)  














  21 Range/Units





  11:05 


 


WBC   (4.5-11.0)  K/uL


 


RBC   (4.30-5.90)  M/uL


 


Hgb   (12.0-15.0)  g/dL


 


Hct   (40.0-54.0)  %


 


MCV   (80-98)  fL


 


MCH   (27-31)  pg


 


MCHC   (32-36)  %


 


Plt Count   (150-400)  K/uL


 


Neut % (Auto)   (36-66)  %


 


Lymph % (Auto)   (24-44)  %


 


Mono % (Auto)   (2-6)  %


 


Eos % (Auto)   (2-4)  %


 


Baso % (Auto)   (0-1)  %


 


Sodium   (140-148)  mmol/L


 


Potassium   (3.6-5.2)  mmol/L


 


Chloride   (100-108)  mmol/L


 


Carbon Dioxide   (21-32)  mmol/L


 


Anion Gap   (5.0-14.0)  mmol/L


 


BUN   (7-18)  mg/dL


 


Creatinine   (0.8-1.3)  mg/dL


 


Est Cr Clr Drug Dosing   mL/min


 


Estimated GFR (MDRD)   (>60)  


 


Glucose   ()  mg/dL


 


Calcium   (8.5-10.1)  mg/dL


 


Total Bilirubin   (0.2-1.0)  mg/dL


 


AST   (15-37)  U/L


 


ALT   (12-78)  U/L


 


Alkaline Phosphatase   ()  U/L


 


Troponin I  0.026  (0.000-0.056)  ng/mL


 


NT-Pro-B Natriuret Pep   (5-125)  pg/mL


 


Total Protein   (6.4-8.2)  g/dL


 


Albumin   (3.4-5.0)  g/dL


 


Globulin   (2.3-3.5)  g/dL


 


Albumin/Globulin Ratio   (1.2-2.2)  


 


Influenza Type A RNA   (NEGATIVE)  


 


RSV RNA (INAAT)   (NEGATIVE)  


 


Influenza Type B RNA   (NEGATIVE)  


 


SARS-CoV-2 RNA (LEENA)   (NEGATIVE)  











Meds: 


Medications











Generic Name Dose Route Start Last Admin





  Trade Name Freq  PRN Reason Stop Dose Admin


 


Sodium Chloride  10 ml  21 12:25  21 12:47





  Sodium Chloride 0.9% 10 Ml Syringe  FLUSH   10 ml





  ASDIRECTED PRN   Administration





  Keep Vein Open  














Discontinued Medications














Generic Name Dose Route Start Last Admin





  Trade Name Freq  PRN Reason Stop Dose Admin


 


Furosemide  80 mg  21 12:25  21 12:47





  Furosemide 40 Mg/4 Ml Vial  IVPUSH  21 12:26  80 mg





  ONETIME ONE   Administration














- Radiology Interpretation


Free Text/Narrative:: 


I reviewed the chest x-ray showing cephalization of the vasculature consistent 

with pulmonary vascular overload.  There is mild pulmonary edema.  There is 

cardiomegaly.








- Re-Assessments/Exams


Free Text/Narrative Re-Assessment/Exam: 





21 16:14 I reviewed the patient's labs showing a leukocytosis of 11.1, 

hemoglobin of 12.6, hematocrit of 30.7 and platelet count of 271,000.  The 

comprehensive metabolic panel shows a sodium 146, potassium 4.7, chloride of 

110, bicarbonate of 27, BUN of 29 with a creatinine 1.5 and a glucose of 142.  

GFR is calculated at 48.  Calcium is normal at 9.0.  Troponin is normal at 

0.026.  proBNP is 8599 which is significant for heart failure.  Covid, RSV, and 

influenza are negative.  Chest x-ray is obtained showing cephalization of the 

vasculature consistent with pulmonary overload and mild pulmonary edema.  The 

patient was given Lasix 80 mg IV push with a significant output of urine (over a

 liter) and marked improvement of his breathing.  At this time I believe he 

suitable for discharge home.  Indications return to the ED were discussed.  I do

 not think he needs ongoing Lasix care during the weekend but should be 

rechecked on Monday.  Indications return to ED were discussed.








Departure





- Departure


Time of Disposition: 16:10


Disposition: Home, Self-Care 01


Clinical Impression: 


 Pulmonary edema cardiac cause, Diastolic dysfunction





Congestive heart failure


Qualifiers:


 Heart failure type: combined systolic and diastolic Heart failure chronicity: 

acute Qualified Code(s): I50.41 - Acute combined systolic (congestive) and 

diastolic (congestive) heart failure








- Discharge Information


Instructions:  Heart Failure, Diagnosis, Easy-to-Read, Living With Heart 

Failure, Heart Failure Exacerbation


Referrals: 


PCP,None [Primary Care Provider] - 


Forms:  ED Department Discharge


Care Plan Goals: 


Your work-up today shows that you are in significant heart failure with a BNP of

over 8000.  You were given Lasix 80 mg IV with good urine output of over a liter

of urine and improvement of your oxygenation and air movement.  I do not think 

that you probably need additional Lasix at this time, however, I would like you 

to follow-up with your primary care provider the next week for recheck of your 

BNP and pulmonary edema.  At this time since you have had such a great 

improvement I do not think you require hospitalization.  Should you become 

significantly short of breath please return to the ED for reevaluation.





Sepsis Event Note (ED)





- Evaluation


Sepsis Screening Result: No Definite Risk





- Focused Exam


Vital Signs: 


                                   Vital Signs











  Temp Pulse Resp BP Pulse Ox


 


 21 16:05   77  16  145/87 H  96


 


 21 14:26  37.0 C  82  24 H  117/74  98


 


 21 12:41   72  18  142/86 H  95


 


 21 12:24   81   143/92 H  98


 


 21 11:22   75   139/82  98


 


 21 10:10  36.7 C  80  18  142/91 H  99














- Problem List & Annotations


(1) HTN (hypertension)


SNOMED Code(s): 87456486


   Code(s): I10 - ESSENTIAL (PRIMARY) HYPERTENSION   Status: Chronic   Priority:

High   Current Visit: No   


Qualifiers: 


   Hypertension type: unspecified   Qualified Code(s): I10 - Essential (primary)

hypertension   





(2) Congestive heart failure


SNOMED Code(s): 50662411


   Code(s): I50.9 - HEART FAILURE, UNSPECIFIED   Status: Acute   Priority: High 

 Current Visit: Yes   


Qualifiers: 


   Heart failure type: combined systolic and diastolic   Heart failure 

chronicity: acute   Qualified Code(s): I50.41 - Acute combined systolic 

(congestive) and diastolic (congestive) heart failure   





(3) Diastolic dysfunction


SNOMED Code(s): 9945512


   Code(s): I51.89 - OTHER ILL-DEFINED HEART DISEASES   Status: Acute   

Priority: High   Current Visit: Yes   





(4) Pulmonary edema cardiac cause


SNOMED Code(s): 74905227, 08435308


   Code(s): I50.1 - LEFT VENTRICULAR FAILURE, UNSPECIFIED   Status: Acute   

Priority: High   Current Visit: Yes   





- Problem List Review


Problem List Initiated/Reviewed/Updated: Yes





- My Orders


Last 24 Hours: 


My Active Orders





21 10:57


Isolation [COMM] Stat 


EKG 12 Lead [EK] Routine 





21 12:25


Sodium Chloride 0.9% [Saline Flush]   10 ml FLUSH ASDIRECTED PRN 


Saline Lock Insert [OM.PC] Routine 














- Assessment/Plan


Last 24 Hours: 


My Active Orders





21 10:57


Isolation [COMM] Stat 


EKG 12 Lead [EK] Routine 





21 12:25


Sodium Chloride 0.9% [Saline Flush]   10 ml FLUSH ASDIRECTED PRN 


Saline Lock Insert [OM.PC] Routine

## 2022-08-15 ENCOUNTER — HOSPITAL ENCOUNTER (INPATIENT)
Dept: HOSPITAL 11 - JP.MS | Age: 62
LOS: 3 days | Discharge: HOME | DRG: 638 | End: 2022-08-18
Attending: INTERNAL MEDICINE | Admitting: INTERNAL MEDICINE
Payer: COMMERCIAL

## 2022-08-15 DIAGNOSIS — I50.42: ICD-10-CM

## 2022-08-15 DIAGNOSIS — Z96.612: ICD-10-CM

## 2022-08-15 DIAGNOSIS — Z90.89: ICD-10-CM

## 2022-08-15 DIAGNOSIS — I25.2: ICD-10-CM

## 2022-08-15 DIAGNOSIS — Z98.890: ICD-10-CM

## 2022-08-15 DIAGNOSIS — Z88.8: ICD-10-CM

## 2022-08-15 DIAGNOSIS — Z79.2: ICD-10-CM

## 2022-08-15 DIAGNOSIS — L97.529: ICD-10-CM

## 2022-08-15 DIAGNOSIS — Z88.2: ICD-10-CM

## 2022-08-15 DIAGNOSIS — E78.00: ICD-10-CM

## 2022-08-15 DIAGNOSIS — Z79.82: ICD-10-CM

## 2022-08-15 DIAGNOSIS — Z95.5: ICD-10-CM

## 2022-08-15 DIAGNOSIS — Z88.6: ICD-10-CM

## 2022-08-15 DIAGNOSIS — Z87.81: ICD-10-CM

## 2022-08-15 DIAGNOSIS — Z98.1: ICD-10-CM

## 2022-08-15 DIAGNOSIS — Z90.49: ICD-10-CM

## 2022-08-15 DIAGNOSIS — Z87.19: ICD-10-CM

## 2022-08-15 DIAGNOSIS — Z79.899: ICD-10-CM

## 2022-08-15 DIAGNOSIS — I25.10: ICD-10-CM

## 2022-08-15 DIAGNOSIS — Z98.52: ICD-10-CM

## 2022-08-15 DIAGNOSIS — I11.0: ICD-10-CM

## 2022-08-15 DIAGNOSIS — F31.9: ICD-10-CM

## 2022-08-15 DIAGNOSIS — Z20.822: ICD-10-CM

## 2022-08-15 DIAGNOSIS — Z96.611: ICD-10-CM

## 2022-08-15 DIAGNOSIS — Z89.431: ICD-10-CM

## 2022-08-15 DIAGNOSIS — L03.116: ICD-10-CM

## 2022-08-15 DIAGNOSIS — Z89.432: ICD-10-CM

## 2022-08-15 DIAGNOSIS — Z86.73: ICD-10-CM

## 2022-08-15 DIAGNOSIS — E11.628: Primary | ICD-10-CM

## 2022-08-15 DIAGNOSIS — Z95.1: ICD-10-CM

## 2022-08-15 DIAGNOSIS — Z96.652: ICD-10-CM

## 2022-08-15 DIAGNOSIS — E11.621: ICD-10-CM

## 2022-08-15 DIAGNOSIS — Z88.5: ICD-10-CM

## 2022-08-15 PROCEDURE — U0002 COVID-19 LAB TEST NON-CDC: HCPCS

## 2022-08-15 PROCEDURE — A9579 GAD-BASE MR CONTRAST NOS,1ML: HCPCS

## 2022-08-15 RX ADMIN — TAZOBACTAM SODIUM AND PIPERACILLIN SODIUM SCH MLS/HR: 375; 3 INJECTION, SOLUTION INTRAVENOUS at 21:15

## 2022-08-15 RX ADMIN — INSULIN LISPRO SCH: 100 INJECTION, SOLUTION INTRAVENOUS; SUBCUTANEOUS at 21:16

## 2022-08-15 RX ADMIN — INSULIN LISPRO SCH: 100 INJECTION, SOLUTION INTRAVENOUS; SUBCUTANEOUS at 17:30

## 2022-08-15 RX ADMIN — TAZOBACTAM SODIUM AND PIPERACILLIN SODIUM SCH MLS/HR: 375; 3 INJECTION, SOLUTION INTRAVENOUS at 16:15

## 2022-08-16 LAB — HBA1C BLD-MCNC: 7.4 % (ref 4.5–6.2)

## 2022-08-16 RX ADMIN — TAZOBACTAM SODIUM AND PIPERACILLIN SODIUM SCH MLS/HR: 375; 3 INJECTION, SOLUTION INTRAVENOUS at 15:52

## 2022-08-16 RX ADMIN — INSULIN LISPRO SCH UNITS: 100 INJECTION, SOLUTION INTRAVENOUS; SUBCUTANEOUS at 17:16

## 2022-08-16 RX ADMIN — TAZOBACTAM SODIUM AND PIPERACILLIN SODIUM SCH MLS/HR: 375; 3 INJECTION, SOLUTION INTRAVENOUS at 09:27

## 2022-08-16 RX ADMIN — INSULIN LISPRO SCH: 100 INJECTION, SOLUTION INTRAVENOUS; SUBCUTANEOUS at 08:10

## 2022-08-16 RX ADMIN — INSULIN LISPRO SCH: 100 INJECTION, SOLUTION INTRAVENOUS; SUBCUTANEOUS at 21:21

## 2022-08-16 RX ADMIN — INSULIN LISPRO SCH: 100 INJECTION, SOLUTION INTRAVENOUS; SUBCUTANEOUS at 12:04

## 2022-08-16 RX ADMIN — TAZOBACTAM SODIUM AND PIPERACILLIN SODIUM SCH MLS/HR: 375; 3 INJECTION, SOLUTION INTRAVENOUS at 03:29

## 2022-08-16 RX ADMIN — TAZOBACTAM SODIUM AND PIPERACILLIN SODIUM SCH MLS/HR: 375; 3 INJECTION, SOLUTION INTRAVENOUS at 21:29

## 2022-08-17 RX ADMIN — ONDANSETRON PRN MG: 2 INJECTION, SOLUTION INTRAMUSCULAR; INTRAVENOUS at 06:42

## 2022-08-17 RX ADMIN — INSULIN LISPRO SCH: 100 INJECTION, SOLUTION INTRAVENOUS; SUBCUTANEOUS at 07:58

## 2022-08-17 RX ADMIN — TAZOBACTAM SODIUM AND PIPERACILLIN SODIUM SCH MLS/HR: 375; 3 INJECTION, SOLUTION INTRAVENOUS at 03:45

## 2022-08-17 RX ADMIN — INSULIN LISPRO SCH: 100 INJECTION, SOLUTION INTRAVENOUS; SUBCUTANEOUS at 16:47

## 2022-08-17 RX ADMIN — TAZOBACTAM SODIUM AND PIPERACILLIN SODIUM SCH MLS/HR: 375; 3 INJECTION, SOLUTION INTRAVENOUS at 21:36

## 2022-08-17 RX ADMIN — TAZOBACTAM SODIUM AND PIPERACILLIN SODIUM SCH MLS/HR: 375; 3 INJECTION, SOLUTION INTRAVENOUS at 09:37

## 2022-08-17 RX ADMIN — TAZOBACTAM SODIUM AND PIPERACILLIN SODIUM SCH MLS/HR: 375; 3 INJECTION, SOLUTION INTRAVENOUS at 15:54

## 2022-08-17 RX ADMIN — INSULIN LISPRO SCH UNITS: 100 INJECTION, SOLUTION INTRAVENOUS; SUBCUTANEOUS at 21:36

## 2022-08-17 RX ADMIN — INSULIN LISPRO SCH UNITS: 100 INJECTION, SOLUTION INTRAVENOUS; SUBCUTANEOUS at 13:36

## 2022-08-18 VITALS — SYSTOLIC BLOOD PRESSURE: 163 MMHG | DIASTOLIC BLOOD PRESSURE: 82 MMHG | HEART RATE: 92 BPM

## 2022-08-18 RX ADMIN — TAZOBACTAM SODIUM AND PIPERACILLIN SODIUM SCH MLS/HR: 375; 3 INJECTION, SOLUTION INTRAVENOUS at 04:36

## 2022-08-18 RX ADMIN — INSULIN LISPRO SCH UNITS: 100 INJECTION, SOLUTION INTRAVENOUS; SUBCUTANEOUS at 11:25

## 2022-08-18 RX ADMIN — ONDANSETRON PRN MG: 2 INJECTION, SOLUTION INTRAMUSCULAR; INTRAVENOUS at 06:54

## 2022-08-18 RX ADMIN — ONDANSETRON PRN MG: 2 INJECTION, SOLUTION INTRAMUSCULAR; INTRAVENOUS at 10:45

## 2022-08-18 RX ADMIN — TAZOBACTAM SODIUM AND PIPERACILLIN SODIUM SCH MLS/HR: 375; 3 INJECTION, SOLUTION INTRAVENOUS at 09:07

## 2022-08-18 RX ADMIN — ONDANSETRON PRN MG: 2 INJECTION, SOLUTION INTRAMUSCULAR; INTRAVENOUS at 02:27

## 2022-08-18 RX ADMIN — INSULIN LISPRO SCH UNITS: 100 INJECTION, SOLUTION INTRAVENOUS; SUBCUTANEOUS at 09:08

## 2023-01-15 ENCOUNTER — HOSPITAL ENCOUNTER (EMERGENCY)
Dept: HOSPITAL 11 - JP.ED | Age: 63
Discharge: HOME | End: 2023-01-15
Payer: COMMERCIAL

## 2023-01-15 VITALS — DIASTOLIC BLOOD PRESSURE: 87 MMHG | SYSTOLIC BLOOD PRESSURE: 187 MMHG | HEART RATE: 85 BPM

## 2023-01-15 DIAGNOSIS — Z79.82: ICD-10-CM

## 2023-01-15 DIAGNOSIS — Z88.2: ICD-10-CM

## 2023-01-15 DIAGNOSIS — I50.9: ICD-10-CM

## 2023-01-15 DIAGNOSIS — E11.9: ICD-10-CM

## 2023-01-15 DIAGNOSIS — R11.2: ICD-10-CM

## 2023-01-15 DIAGNOSIS — Z91.09: ICD-10-CM

## 2023-01-15 DIAGNOSIS — F41.9: Primary | ICD-10-CM

## 2023-01-15 PROCEDURE — 80053 COMPREHEN METABOLIC PANEL: CPT

## 2023-01-15 PROCEDURE — 96375 TX/PRO/DX INJ NEW DRUG ADDON: CPT

## 2023-01-15 PROCEDURE — 96374 THER/PROPH/DIAG INJ IV PUSH: CPT

## 2023-01-15 PROCEDURE — 99284 EMERGENCY DEPT VISIT MOD MDM: CPT

## 2023-01-15 PROCEDURE — 96361 HYDRATE IV INFUSION ADD-ON: CPT

## 2023-01-15 PROCEDURE — 85025 COMPLETE CBC W/AUTO DIFF WBC: CPT

## 2023-01-15 PROCEDURE — 96376 TX/PRO/DX INJ SAME DRUG ADON: CPT

## 2023-01-15 PROCEDURE — 36415 COLL VENOUS BLD VENIPUNCTURE: CPT

## 2023-01-15 PROCEDURE — 83690 ASSAY OF LIPASE: CPT

## 2023-01-18 ENCOUNTER — HOSPITAL ENCOUNTER (EMERGENCY)
Dept: HOSPITAL 11 - JP.ED | Age: 63
Discharge: HOME | End: 2023-01-18
Payer: COMMERCIAL

## 2023-01-18 VITALS — DIASTOLIC BLOOD PRESSURE: 88 MMHG | SYSTOLIC BLOOD PRESSURE: 163 MMHG | HEART RATE: 90 BPM

## 2023-01-18 DIAGNOSIS — Z95.1: ICD-10-CM

## 2023-01-18 DIAGNOSIS — Z79.82: ICD-10-CM

## 2023-01-18 DIAGNOSIS — Z79.02: ICD-10-CM

## 2023-01-18 DIAGNOSIS — E11.9: ICD-10-CM

## 2023-01-18 DIAGNOSIS — Z86.73: ICD-10-CM

## 2023-01-18 DIAGNOSIS — I10: ICD-10-CM

## 2023-01-18 DIAGNOSIS — F41.9: Primary | ICD-10-CM

## 2023-01-18 DIAGNOSIS — I25.10: ICD-10-CM

## 2023-01-18 DIAGNOSIS — E86.0: ICD-10-CM

## 2023-01-18 DIAGNOSIS — Z88.2: ICD-10-CM

## 2023-01-18 DIAGNOSIS — Z91.048: ICD-10-CM

## 2023-01-18 DIAGNOSIS — I25.2: ICD-10-CM

## 2023-01-18 DIAGNOSIS — Z88.5: ICD-10-CM

## 2023-01-18 DIAGNOSIS — R11.2: ICD-10-CM

## 2023-01-18 DIAGNOSIS — Z88.8: ICD-10-CM

## 2023-01-18 DIAGNOSIS — E78.00: ICD-10-CM

## 2023-01-18 PROCEDURE — 36415 COLL VENOUS BLD VENIPUNCTURE: CPT

## 2023-01-18 PROCEDURE — 80053 COMPREHEN METABOLIC PANEL: CPT

## 2023-01-18 PROCEDURE — 96375 TX/PRO/DX INJ NEW DRUG ADDON: CPT

## 2023-01-18 PROCEDURE — 83605 ASSAY OF LACTIC ACID: CPT

## 2023-01-18 PROCEDURE — 83690 ASSAY OF LIPASE: CPT

## 2023-01-18 PROCEDURE — 74177 CT ABD & PELVIS W/CONTRAST: CPT

## 2023-01-18 PROCEDURE — 99284 EMERGENCY DEPT VISIT MOD MDM: CPT

## 2023-01-18 PROCEDURE — 70450 CT HEAD/BRAIN W/O DYE: CPT

## 2023-01-18 PROCEDURE — 82803 BLOOD GASES ANY COMBINATION: CPT

## 2023-01-18 PROCEDURE — 85025 COMPLETE CBC W/AUTO DIFF WBC: CPT

## 2023-01-18 PROCEDURE — 96361 HYDRATE IV INFUSION ADD-ON: CPT

## 2023-01-18 PROCEDURE — 81001 URINALYSIS AUTO W/SCOPE: CPT

## 2023-01-18 PROCEDURE — 96374 THER/PROPH/DIAG INJ IV PUSH: CPT

## 2023-08-10 ENCOUNTER — HOSPITAL ENCOUNTER (EMERGENCY)
Dept: HOSPITAL 11 - JP.ED | Age: 63
Discharge: HOME | End: 2023-08-10
Payer: COMMERCIAL

## 2023-08-10 VITALS — SYSTOLIC BLOOD PRESSURE: 140 MMHG | DIASTOLIC BLOOD PRESSURE: 86 MMHG | HEART RATE: 76 BPM

## 2023-08-10 DIAGNOSIS — E11.9: ICD-10-CM

## 2023-08-10 DIAGNOSIS — Z88.6: ICD-10-CM

## 2023-08-10 DIAGNOSIS — Z79.82: ICD-10-CM

## 2023-08-10 DIAGNOSIS — Z79.899: ICD-10-CM

## 2023-08-10 DIAGNOSIS — I25.10: ICD-10-CM

## 2023-08-10 DIAGNOSIS — S70.02XA: Primary | ICD-10-CM

## 2023-08-10 DIAGNOSIS — Z88.5: ICD-10-CM

## 2023-08-10 DIAGNOSIS — I25.2: ICD-10-CM

## 2023-08-10 DIAGNOSIS — Z86.73: ICD-10-CM

## 2023-08-10 DIAGNOSIS — S30.0XXA: ICD-10-CM

## 2023-08-10 DIAGNOSIS — Z91.048: ICD-10-CM

## 2023-08-10 DIAGNOSIS — I10: ICD-10-CM

## 2023-08-10 DIAGNOSIS — Z79.4: ICD-10-CM

## 2023-08-10 DIAGNOSIS — E78.00: ICD-10-CM
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
## 2023-09-18 ENCOUNTER — HOSPITAL ENCOUNTER (EMERGENCY)
Dept: HOSPITAL 11 - JP.ED | Age: 63
Discharge: HOME | End: 2023-09-18
Payer: COMMERCIAL

## 2023-09-18 VITALS — HEART RATE: 79 BPM | SYSTOLIC BLOOD PRESSURE: 146 MMHG | DIASTOLIC BLOOD PRESSURE: 91 MMHG

## 2023-09-18 DIAGNOSIS — I25.10: Primary | ICD-10-CM

## 2023-09-18 DIAGNOSIS — Z88.5: ICD-10-CM

## 2023-09-18 DIAGNOSIS — Z79.899: ICD-10-CM

## 2023-09-18 DIAGNOSIS — Z91.048: ICD-10-CM

## 2023-09-18 DIAGNOSIS — Z79.82: ICD-10-CM

## 2023-09-18 DIAGNOSIS — Z95.1: ICD-10-CM

## 2023-09-18 DIAGNOSIS — I11.0: ICD-10-CM

## 2023-09-18 DIAGNOSIS — I25.2: ICD-10-CM

## 2023-09-18 DIAGNOSIS — Z79.02: ICD-10-CM

## 2023-09-18 DIAGNOSIS — E78.00: ICD-10-CM

## 2023-09-18 DIAGNOSIS — Z91.199: ICD-10-CM

## 2023-09-18 DIAGNOSIS — Z88.6: ICD-10-CM

## 2023-09-18 DIAGNOSIS — Z88.2: ICD-10-CM

## 2023-09-18 DIAGNOSIS — E11.9: ICD-10-CM

## 2023-09-18 DIAGNOSIS — M19.90: ICD-10-CM

## 2023-09-18 DIAGNOSIS — Z88.8: ICD-10-CM

## 2023-09-18 DIAGNOSIS — I50.9: ICD-10-CM

## 2023-09-18 LAB
ANION GAP SERPL CALC-SCNC: 8.2 MMOL/L (ref 5–14)
BASOPHILS # BLD AUTO: 0.04 K/UL (ref 0–0.1)
BASOPHILS NFR BLD AUTO: 0.4 % (ref 0.1–1.3)
BUN SERPL-MCNC: 30 MG/DL (ref 7–18)
CALCIUM SERPL-MCNC: 8.6 MG/DL (ref 8.5–10.1)
CHLORIDE SERPL-SCNC: 108 MMOL/L (ref 100–108)
CO2 SERPL-SCNC: 25 MMOL/L (ref 21–32)
CREAT CL 24H UR+SERPL-VRATE: 54.38 ML/MIN
CREAT SERPL-MCNC: 1.3 MG/DL (ref 0.8–1.3)
EOSINOPHIL # BLD AUTO: 0.2 K/UL (ref 0–0.4)
EOSINOPHIL NFR BLD AUTO: 1.9 % (ref 0–5.4)
GLUCOSE SERPL-MCNC: 138 MG/DL (ref 74–106)
HCT VFR BLD AUTO: 34.8 % (ref 38.4–49.7)
HGB BLD-MCNC: 11 G/DL (ref 12.9–16.9)
IMM GRANULOCYTES # BLD: 0.08 K/UL (ref 0–0.23)
IMM GRANULOCYTES NFR BLD: 0.7 % (ref 0–0.7)
LYMPHOCYTES # BLD AUTO: 1.3 K/UL (ref 0.8–3.3)
LYMPHOCYTES NFR BLD AUTO: 12.1 % (ref 11.4–47.7)
MCH RBC QN AUTO: 25.8 PG (ref 31.6–35.5)
MCHC RBC AUTO-ENTMCNC: 31.6 G/DL (ref 31.6–35.5)
MCHC RBC AUTO-ENTMCNC: 81.7 FL (ref 81.4–99)
MONOCYTES # BLD AUTO: 0.65 K/UL (ref 0.2–0.9)
MONOCYTES NFR BLD AUTO: 6.1 % (ref 3.3–12.6)
NEUTROPHILS # BLD AUTO: 8.45 K/UL (ref 1–7.6)
NEUTROPHILS NFR BLD AUTO: 78.8 % (ref 40–78.1)
PLATELET # BLD AUTO: 299 K/UL (ref 130–375)
POTASSIUM SERPL-SCNC: 4.7 MMOL/L (ref 3.6–5.2)
RBC # BLD AUTO: 4.26 M/UL (ref 4.14–5.76)
SODIUM SERPL-SCNC: 141 MMOL/L (ref 140–148)
WBC # BLD AUTO: 10.7 K/UL (ref 3.2–11)

## 2023-09-18 PROCEDURE — 85025 COMPLETE CBC W/AUTO DIFF WBC: CPT

## 2023-09-18 PROCEDURE — 99284 EMERGENCY DEPT VISIT MOD MDM: CPT

## 2023-09-18 PROCEDURE — 80048 BASIC METABOLIC PNL TOTAL CA: CPT

## 2023-09-18 PROCEDURE — 36415 COLL VENOUS BLD VENIPUNCTURE: CPT

## 2024-01-16 ENCOUNTER — HOSPITAL ENCOUNTER (EMERGENCY)
Dept: HOSPITAL 11 - JP.ED | Age: 64
Discharge: HOME | End: 2024-01-16
Payer: COMMERCIAL

## 2024-01-16 VITALS — DIASTOLIC BLOOD PRESSURE: 99 MMHG | SYSTOLIC BLOOD PRESSURE: 167 MMHG | HEART RATE: 88 BPM

## 2024-01-16 DIAGNOSIS — Z88.5: ICD-10-CM

## 2024-01-16 DIAGNOSIS — Z88.2: ICD-10-CM

## 2024-01-16 DIAGNOSIS — Z95.5: ICD-10-CM

## 2024-01-16 DIAGNOSIS — Z91.048: ICD-10-CM

## 2024-01-16 DIAGNOSIS — Z79.02: ICD-10-CM

## 2024-01-16 DIAGNOSIS — Z88.8: ICD-10-CM

## 2024-01-16 DIAGNOSIS — E11.22: ICD-10-CM

## 2024-01-16 DIAGNOSIS — I25.2: ICD-10-CM

## 2024-01-16 DIAGNOSIS — N18.9: ICD-10-CM

## 2024-01-16 DIAGNOSIS — E78.00: ICD-10-CM

## 2024-01-16 DIAGNOSIS — I50.42: ICD-10-CM

## 2024-01-16 DIAGNOSIS — I25.10: ICD-10-CM

## 2024-01-16 DIAGNOSIS — I13.0: Primary | ICD-10-CM

## 2024-01-16 DIAGNOSIS — M19.90: ICD-10-CM

## 2024-01-16 DIAGNOSIS — Z79.82: ICD-10-CM

## 2024-01-16 DIAGNOSIS — Z95.0: ICD-10-CM

## 2024-01-16 DIAGNOSIS — Z86.73: ICD-10-CM

## 2024-01-16 DIAGNOSIS — Z79.899: ICD-10-CM

## 2024-01-16 RX ADMIN — Medication PRN ML: at 19:10

## 2024-01-16 RX ADMIN — ONDANSETRON ONE MG: 2 INJECTION, SOLUTION INTRAMUSCULAR; INTRAVENOUS at 19:09

## 2024-10-05 ENCOUNTER — HOSPITAL ENCOUNTER (INPATIENT)
Dept: HOSPITAL 11 - JP.ED | Age: 64
LOS: 8 days | Discharge: HOME | DRG: 438 | End: 2024-10-13
Attending: INTERNAL MEDICINE | Admitting: INTERNAL MEDICINE
Payer: COMMERCIAL

## 2024-10-05 DIAGNOSIS — Z87.81: ICD-10-CM

## 2024-10-05 DIAGNOSIS — E78.00: ICD-10-CM

## 2024-10-05 DIAGNOSIS — C25.0: ICD-10-CM

## 2024-10-05 DIAGNOSIS — N18.9: ICD-10-CM

## 2024-10-05 DIAGNOSIS — Z88.2: ICD-10-CM

## 2024-10-05 DIAGNOSIS — E80.6: ICD-10-CM

## 2024-10-05 DIAGNOSIS — M19.90: ICD-10-CM

## 2024-10-05 DIAGNOSIS — E11.22: ICD-10-CM

## 2024-10-05 DIAGNOSIS — E87.20: ICD-10-CM

## 2024-10-05 DIAGNOSIS — Z95.5: ICD-10-CM

## 2024-10-05 DIAGNOSIS — Z88.8: ICD-10-CM

## 2024-10-05 DIAGNOSIS — Z79.899: ICD-10-CM

## 2024-10-05 DIAGNOSIS — Z95.1: ICD-10-CM

## 2024-10-05 DIAGNOSIS — Z88.5: ICD-10-CM

## 2024-10-05 DIAGNOSIS — Z89.432: ICD-10-CM

## 2024-10-05 DIAGNOSIS — F31.9: ICD-10-CM

## 2024-10-05 DIAGNOSIS — Z96.659: ICD-10-CM

## 2024-10-05 DIAGNOSIS — D72.829: ICD-10-CM

## 2024-10-05 DIAGNOSIS — N17.9: ICD-10-CM

## 2024-10-05 DIAGNOSIS — K85.90: Primary | ICD-10-CM

## 2024-10-05 DIAGNOSIS — I12.9: ICD-10-CM

## 2024-10-05 DIAGNOSIS — I25.10: ICD-10-CM

## 2024-10-05 DIAGNOSIS — Z89.431: ICD-10-CM

## 2024-10-05 DIAGNOSIS — Z90.49: ICD-10-CM

## 2024-10-05 DIAGNOSIS — Z86.73: ICD-10-CM

## 2024-10-05 DIAGNOSIS — Z66: ICD-10-CM

## 2024-10-05 DIAGNOSIS — J96.01: ICD-10-CM

## 2024-10-05 DIAGNOSIS — I25.2: ICD-10-CM

## 2024-10-05 DIAGNOSIS — Z98.890: ICD-10-CM

## 2024-10-05 LAB
ALBUMIN SERPL-MCNC: 3.1 G/DL (ref 3.4–5)
ALBUMIN/GLOB SERPL: 1 {RATIO} (ref 1.2–2.2)
ALP SERPL-CCNC: 133 U/L (ref 46–116)
ALT SERPL-CCNC: 28 U/L (ref 12–78)
ANION GAP SERPL CALC-SCNC: 13.9 MMOL/L (ref 5–14)
AST SERPL-CCNC: 22 U/L (ref 15–37)
BILIRUB SERPL-MCNC: 1.8 MG/DL (ref 0.2–1)
BUN SERPL-MCNC: 35 MG/DL (ref 7–18)
CALCIUM SERPL-MCNC: 9 MG/DL (ref 8.5–10.1)
CHLORIDE SERPL-SCNC: 105 MMOL/L (ref 100–108)
CO2 SERPL-SCNC: 23 MMOL/L (ref 21–32)
CREAT CL 24H UR+SERPL-VRATE: 38.19 ML/MIN
CREAT SERPL-MCNC: 1.7 MG/DL (ref 0.8–1.3)
GLOBULIN SER-MCNC: 3.1 G/DL (ref 2.3–3.5)
GLUCOSE SERPL-MCNC: 169 MG/DL (ref 74–106)
HCT VFR BLD AUTO: 38.1 % (ref 38.4–49.7)
HGB BLD-MCNC: 13.2 G/DL (ref 12.9–16.9)
LIPASE SERPL-CCNC: 720 U/L (ref 16–77)
LYMPHOCYTES # BLD: 1.82 K/UL (ref 0.8–3.3)
LYMPHOCYTES NFR BLD MANUAL: 5 % (ref 24–44)
MCH RBC QN AUTO: 27.7 PG (ref 31.6–35.5)
MCHC RBC AUTO-ENTMCNC: 34.6 G/DL (ref 31.6–35.5)
MCHC RBC AUTO-ENTMCNC: 79.9 FL (ref 81.4–99)
METAMYELOCYTES # BLD MANUAL: 0.73 K/UL
METAMYELOCYTES NFR BLD MANUAL: 2 %
MONOCYTES # BLD MANUAL: 0.73 K/UL (ref 0.2–0.9)
MONOCYTES NFR BLD MANUAL: 2 % (ref 2–6)
NEUTROPHILS # BLD: 25.84 K/UL (ref 1–7.6)
NEUTROPHILS NFR BLD MANUAL: 71 % (ref 36–66)
NEUTS BAND # BLD MANUAL: 7.28 K/UL
NEUTS BAND NFR BLD MANUAL: 20 % (ref 5–11)
PLATELET # BLD AUTO: 139 K/UL (ref 130–375)
POTASSIUM SERPL-SCNC: 3.6 MMOL/L (ref 3.6–5.2)
PROT SERPL-MCNC: 6.2 G/DL (ref 6.4–8.2)
RBC # BLD AUTO: 4.77 M/UL (ref 4.14–5.76)
SODIUM SERPL-SCNC: 142 MMOL/L (ref 140–148)
WBC # BLD AUTO: 36.4 K/UL (ref 3.2–11)

## 2024-10-05 RX ADMIN — IOPAMIDOL SCH ML: 612 INJECTION, SOLUTION INTRAVENOUS at 19:54

## 2024-10-05 RX ADMIN — Medication PRN ML: at 19:38

## 2024-10-05 RX ADMIN — LORAZEPAM ONE MG: 2 INJECTION INTRAMUSCULAR; INTRAVENOUS at 21:40

## 2024-10-05 RX ADMIN — LORAZEPAM ONE: 2 INJECTION INTRAMUSCULAR; INTRAVENOUS at 21:41

## 2024-10-06 LAB
ANION GAP SERPL CALC-SCNC: 9.9 MMOL/L (ref 5–14)
APPEARANCE UR: CLEAR
BACTERIA URNS QL MICRO: (no result)
BILIRUB UR STRIP-MCNC: (no result) MG/DL
BUN SERPL-MCNC: 39 MG/DL (ref 7–18)
CALCIUM SERPL-MCNC: 8.3 MG/DL (ref 8.5–10.1)
CHLORIDE SERPL-SCNC: 107 MMOL/L (ref 100–108)
CO2 SERPL-SCNC: 26 MMOL/L (ref 21–32)
COLOR UR: YELLOW
CREAT CL 24H UR+SERPL-VRATE: 36.06 ML/MIN
CREAT SERPL-MCNC: 1.8 MG/DL (ref 0.8–1.3)
CRP SERPL-MCNC: 1.33 MG/DL (ref ?–0.5)
EPI CELLS #/AREA URNS HPF: (no result) /[HPF]
GLUCOSE SERPL-MCNC: 259 MG/DL (ref 74–106)
GLUCOSE UR STRIP-MCNC: 500 MG/DL
HCT VFR BLD AUTO: 34.3 % (ref 38.4–49.7)
HGB BLD-MCNC: 11.4 G/DL (ref 12.9–16.9)
KETONES UR STRIP-MCNC: 15 MG/DL
LIPASE SERPL-CCNC: 304 U/L (ref 16–77)
LYMPHOCYTES # BLD: 0.59 K/UL (ref 0.8–3.3)
LYMPHOCYTES NFR BLD MANUAL: 2 % (ref 24–44)
MCH RBC QN AUTO: 27 PG (ref 31.6–35.5)
MCHC RBC AUTO-ENTMCNC: 33.2 G/DL (ref 31.6–35.5)
MCHC RBC AUTO-ENTMCNC: 81.1 FL (ref 81.4–99)
METAMYELOCYTES # BLD MANUAL: 1.17 K/UL
METAMYELOCYTES NFR BLD MANUAL: 4 %
MONOCYTES # BLD MANUAL: 0.29 K/UL (ref 0.2–0.9)
MONOCYTES NFR BLD MANUAL: 1 % (ref 2–6)
NEUTROPHILS # BLD: 25.78 K/UL (ref 1–7.6)
NEUTROPHILS NFR BLD MANUAL: 88 % (ref 36–66)
NEUTS BAND # BLD MANUAL: 1.47 K/UL
NEUTS BAND NFR BLD MANUAL: 5 % (ref 5–11)
NITRITE UR QL: NEGATIVE
PH UR STRIP: 5.5 [PH] (ref 5–8)
PLATELET # BLD AUTO: 132 K/UL (ref 130–375)
POTASSIUM SERPL-SCNC: 4.9 MMOL/L (ref 3.6–5.2)
PROT UR STRIP-MCNC: 100 MG/DL
RBC # BLD AUTO: 4.23 M/UL (ref 4.14–5.76)
RBC # URNS HPF: (no result) /ML (ref 0–5)
RBC UR QL: (no result)
SODIUM SERPL-SCNC: 143 MMOL/L (ref 140–148)
SP GR UR STRIP: 1.02 (ref 1.01–1.03)
UROBILINOGEN UR STRIP-ACNC: 1 EU/DL (ref 0.2–1)
WBC # BLD AUTO: 29.3 K/UL (ref 3.2–11)
WBC UR QL: (no result) (ref 0–5)

## 2024-10-06 RX ADMIN — ONDANSETRON PRN MG: 2 INJECTION, SOLUTION INTRAMUSCULAR; INTRAVENOUS at 00:47

## 2024-10-06 RX ADMIN — INSULIN LISPRO SCH UNITS: 100 INJECTION, SOLUTION INTRAVENOUS; SUBCUTANEOUS at 12:04

## 2024-10-06 RX ADMIN — LORAZEPAM PRN MG: 2 INJECTION INTRAMUSCULAR; INTRAVENOUS at 03:09

## 2024-10-07 LAB
ALBUMIN SERPL-MCNC: 3 G/DL (ref 3.4–5)
ALBUMIN/GLOB SERPL: 1.1 {RATIO} (ref 1.2–2.2)
ALP SERPL-CCNC: 164 U/L (ref 46–116)
ALT SERPL-CCNC: 58 U/L (ref 12–78)
ANION GAP SERPL CALC-SCNC: 16.4 MMOL/L (ref 5–14)
AST SERPL-CCNC: 37 U/L (ref 15–37)
BILIRUB SERPL-MCNC: 1.1 MG/DL (ref 0.2–1)
BUN SERPL-MCNC: 46 MG/DL (ref 7–18)
CALCIUM SERPL-MCNC: 8.3 MG/DL (ref 8.5–10.1)
CHLORIDE SERPL-SCNC: 111 MMOL/L (ref 100–108)
CO2 SERPL-SCNC: 23 MMOL/L (ref 21–32)
CREAT CL 24H UR+SERPL-VRATE: 38.19 ML/MIN
CREAT SERPL-MCNC: 1.7 MG/DL (ref 0.8–1.3)
GLOBULIN SER-MCNC: 2.8 G/DL (ref 2.3–3.5)
GLUCOSE SERPL-MCNC: 225 MG/DL (ref 74–106)
HCT VFR BLD AUTO: 34.8 % (ref 38.4–49.7)
HGB BLD-MCNC: 11.6 G/DL (ref 12.9–16.9)
LIPASE SERPL-CCNC: 98 U/L (ref 16–77)
MCH RBC QN AUTO: 27.4 PG (ref 31.6–35.5)
MCHC RBC AUTO-ENTMCNC: 33.3 G/DL (ref 31.6–35.5)
MCHC RBC AUTO-ENTMCNC: 82.1 FL (ref 81.4–99)
PLATELET # BLD AUTO: 133 K/UL (ref 130–375)
POTASSIUM SERPL-SCNC: 4.4 MMOL/L (ref 3.6–5.2)
PROT SERPL-MCNC: 5.8 G/DL (ref 6.4–8.2)
RBC # BLD AUTO: 4.24 M/UL (ref 4.14–5.76)
SODIUM SERPL-SCNC: 146 MMOL/L (ref 140–148)
WBC # BLD AUTO: 26 K/UL (ref 3.2–11)

## 2024-10-08 LAB
ALBUMIN SERPL-MCNC: 3.1 G/DL (ref 3.4–5)
ALBUMIN/GLOB SERPL: 1.2 {RATIO} (ref 1.2–2.2)
ALP SERPL-CCNC: 179 U/L (ref 46–116)
ALT SERPL-CCNC: 75 U/L (ref 12–78)
ANION GAP SERPL CALC-SCNC: 15 MMOL/L (ref 5–14)
AST SERPL-CCNC: 42 U/L (ref 15–37)
BILIRUB SERPL-MCNC: 1.3 MG/DL (ref 0.2–1)
BUN SERPL-MCNC: 57 MG/DL (ref 7–18)
CALCIUM SERPL-MCNC: 8.7 MG/DL (ref 8.5–10.1)
CHLORIDE SERPL-SCNC: 114 MMOL/L (ref 100–108)
CO2 SERPL-SCNC: 23 MMOL/L (ref 21–32)
CREAT CL 24H UR+SERPL-VRATE: 30.87 ML/MIN
CREAT SERPL-MCNC: 2.1 MG/DL (ref 0.8–1.3)
GLOBULIN SER-MCNC: 2.7 G/DL (ref 2.3–3.5)
GLUCOSE SERPL-MCNC: 212 MG/DL (ref 74–106)
HCT VFR BLD AUTO: 36.8 % (ref 38.4–49.7)
HGB BLD-MCNC: 11.8 G/DL (ref 12.9–16.9)
LIPASE SERPL-CCNC: 42 U/L (ref 16–77)
MAGNESIUM SERPL-MCNC: 2.2 MG/DL (ref 1.8–2.4)
MCH RBC QN AUTO: 26.9 PG (ref 31.6–35.5)
MCHC RBC AUTO-ENTMCNC: 32.1 G/DL (ref 31.6–35.5)
MCHC RBC AUTO-ENTMCNC: 84 FL (ref 81.4–99)
PLATELET # BLD AUTO: 141 K/UL (ref 130–375)
POTASSIUM SERPL-SCNC: 4 MMOL/L (ref 3.6–5.2)
PROT SERPL-MCNC: 5.8 G/DL (ref 6.4–8.2)
RBC # BLD AUTO: 4.38 M/UL (ref 4.14–5.76)
SODIUM SERPL-SCNC: 148 MMOL/L (ref 140–148)
WBC # BLD AUTO: 25 K/UL (ref 3.2–11)

## 2024-10-08 RX ADMIN — LORAZEPAM PRN MG: 2 INJECTION INTRAMUSCULAR; INTRAVENOUS at 04:52

## 2024-10-08 RX ADMIN — PROCHLORPERAZINE EDISYLATE PRN MG: 5 INJECTION INTRAMUSCULAR; INTRAVENOUS at 13:34

## 2024-10-08 RX ADMIN — SCOPALAMINE SCH MG: 1 PATCH, EXTENDED RELEASE TRANSDERMAL at 12:17

## 2024-10-08 RX ADMIN — DEXAMETHASONE SODIUM PHOSPHATE SCH MG: 4 INJECTION, SOLUTION INTRAMUSCULAR; INTRAVENOUS at 17:25

## 2024-10-09 LAB
ALBUMIN SERPL-MCNC: 2.6 G/DL (ref 3.4–5)
ALBUMIN/GLOB SERPL: 1.1 {RATIO} (ref 1.2–2.2)
ALP SERPL-CCNC: 139 U/L (ref 46–116)
ALT SERPL-CCNC: 92 U/L (ref 12–78)
ANION GAP SERPL CALC-SCNC: 12.7 MMOL/L (ref 5–14)
AST SERPL-CCNC: 55 U/L (ref 15–37)
BILIRUB SERPL-MCNC: 1 MG/DL (ref 0.2–1)
BUN SERPL-MCNC: 51 MG/DL (ref 7–18)
CALCIUM SERPL-MCNC: 8.1 MG/DL (ref 8.5–10.1)
CHLORIDE SERPL-SCNC: 116 MMOL/L (ref 100–108)
CO2 SERPL-SCNC: 23 MMOL/L (ref 21–32)
CREAT CL 24H UR+SERPL-VRATE: 34.12 ML/MIN
CREAT SERPL-MCNC: 1.9 MG/DL (ref 0.8–1.3)
CRP SERPL-MCNC: < 0.5 MG/DL (ref ?–0.5)
GLOBULIN SER-MCNC: 2.4 G/DL (ref 2.3–3.5)
GLUCOSE SERPL-MCNC: 173 MG/DL (ref 74–106)
HCT VFR BLD AUTO: 32.1 % (ref 38.4–49.7)
HGB BLD-MCNC: 10.4 G/DL (ref 12.9–16.9)
LIPASE SERPL-CCNC: 27 U/L (ref 16–77)
MCH RBC QN AUTO: 27.4 PG (ref 31.6–35.5)
MCHC RBC AUTO-ENTMCNC: 32.4 G/DL (ref 31.6–35.5)
MCHC RBC AUTO-ENTMCNC: 84.7 FL (ref 81.4–99)
PLATELET # BLD AUTO: 107 K/UL (ref 130–375)
POTASSIUM SERPL-SCNC: 3.7 MMOL/L (ref 3.6–5.2)
PROT SERPL-MCNC: 5 G/DL (ref 6.4–8.2)
RBC # BLD AUTO: 3.79 M/UL (ref 4.14–5.76)
SODIUM SERPL-SCNC: 148 MMOL/L (ref 140–148)
WBC # BLD AUTO: 10.4 K/UL (ref 3.2–11)

## 2024-10-09 RX ADMIN — Medication SCH: at 10:17

## 2024-10-09 RX ADMIN — POTASSIUM CHLORIDE ONE MEQ: 1500 TABLET, EXTENDED RELEASE ORAL at 14:20

## 2024-10-10 LAB
ALBUMIN SERPL-MCNC: 2.5 G/DL (ref 3.4–5)
ALBUMIN/GLOB SERPL: 0.9 {RATIO} (ref 1.2–2.2)
ALP SERPL-CCNC: 131 U/L (ref 46–116)
ALT SERPL-CCNC: 83 U/L (ref 12–78)
ANION GAP SERPL CALC-SCNC: 14.6 MMOL/L (ref 5–14)
AST SERPL-CCNC: 30 U/L (ref 15–37)
BILIRUB DIRECT SERPL-MCNC: 0.31 MG/DL (ref 0–0.2)
BILIRUB INDIRECT SERPL-MCNC: 0.59 MG/DL
BILIRUB SERPL-MCNC: 0.9 MG/DL (ref 0.2–1)
BUN SERPL-MCNC: 43 MG/DL (ref 7–18)
CALCIUM SERPL-MCNC: 8.1 MG/DL (ref 8.5–10.1)
CHLORIDE SERPL-SCNC: 113 MMOL/L (ref 100–108)
CO2 SERPL-SCNC: 21 MMOL/L (ref 21–32)
CREAT CL 24H UR+SERPL-VRATE: 36.01 ML/MIN
CREAT SERPL-MCNC: 1.8 MG/DL (ref 0.8–1.3)
CRP SERPL-MCNC: < 0.5 MG/DL (ref ?–0.5)
GLOBULIN SER-MCNC: 2.7 G/DL (ref 2.3–3.5)
GLUCOSE SERPL-MCNC: 205 MG/DL (ref 74–106)
HCT VFR BLD AUTO: 32.2 % (ref 38.4–49.7)
HGB BLD-MCNC: 10.7 G/DL (ref 12.9–16.9)
MAGNESIUM SERPL-MCNC: 1.9 MG/DL (ref 1.8–2.4)
MCH RBC QN AUTO: 27.3 PG (ref 31.6–35.5)
MCHC RBC AUTO-ENTMCNC: 33.2 G/DL (ref 31.6–35.5)
MCHC RBC AUTO-ENTMCNC: 82.1 FL (ref 81.4–99)
PHOSPHATE SERPL-MCNC: 2.9 MG/DL (ref 2.5–4.9)
PLATELET # BLD AUTO: 112 K/UL (ref 130–375)
POTASSIUM SERPL-SCNC: 3.6 MMOL/L (ref 3.6–5.2)
PROT SERPL-MCNC: 5.2 G/DL (ref 6.4–8.2)
RBC # BLD AUTO: 3.92 M/UL (ref 4.14–5.76)
SODIUM SERPL-SCNC: 145 MMOL/L (ref 140–148)
WBC # BLD AUTO: 5.1 K/UL (ref 3.2–11)

## 2024-10-11 LAB
ALBUMIN SERPL-MCNC: 2.5 G/DL (ref 3.4–5)
ALBUMIN/GLOB SERPL: 1 {RATIO} (ref 1.2–2.2)
ALP SERPL-CCNC: 129 U/L (ref 46–116)
ALT SERPL-CCNC: 68 U/L (ref 12–78)
ANION GAP SERPL CALC-SCNC: 14.6 MMOL/L (ref 5–14)
AST SERPL-CCNC: 19 U/L (ref 15–37)
BILIRUB DIRECT SERPL-MCNC: 0.32 MG/DL (ref 0–0.2)
BILIRUB INDIRECT SERPL-MCNC: 0.48 MG/DL
BILIRUB SERPL-MCNC: 0.8 MG/DL (ref 0.2–1)
BUN SERPL-MCNC: 31 MG/DL (ref 7–18)
CALCIUM SERPL-MCNC: 8 MG/DL (ref 8.5–10.1)
CHLORIDE SERPL-SCNC: 110 MMOL/L (ref 100–108)
CO2 SERPL-SCNC: 22 MMOL/L (ref 21–32)
CREAT CL 24H UR+SERPL-VRATE: 38.13 ML/MIN
CREAT SERPL-MCNC: 1.7 MG/DL (ref 0.8–1.3)
GLOBULIN SER-MCNC: 2.6 G/DL (ref 2.3–3.5)
GLUCOSE SERPL-MCNC: 169 MG/DL (ref 74–106)
HCT VFR BLD AUTO: 33.6 % (ref 38.4–49.7)
HGB BLD-MCNC: 11.1 G/DL (ref 12.9–16.9)
MCH RBC QN AUTO: 27.4 PG (ref 31.6–35.5)
MCHC RBC AUTO-ENTMCNC: 33 G/DL (ref 31.6–35.5)
MCHC RBC AUTO-ENTMCNC: 83 FL (ref 81.4–99)
PHOSPHATE SERPL-MCNC: 2.8 MG/DL (ref 2.5–4.9)
PLATELET # BLD AUTO: 115 K/UL (ref 130–375)
POTASSIUM SERPL-SCNC: 3.6 MMOL/L (ref 3.6–5.2)
PROT SERPL-MCNC: 5.1 G/DL (ref 6.4–8.2)
RBC # BLD AUTO: 4.05 M/UL (ref 4.14–5.76)
SODIUM SERPL-SCNC: 143 MMOL/L (ref 140–148)
WBC # BLD AUTO: 5.4 K/UL (ref 3.2–11)

## 2024-10-12 LAB
ALBUMIN SERPL-MCNC: 2.3 G/DL (ref 3.4–5)
ALBUMIN SERPL-MCNC: 2.3 G/DL (ref 3.4–5)
ALBUMIN/GLOB SERPL: 1 {RATIO} (ref 1.2–2.2)
ALP SERPL-CCNC: 119 U/L (ref 46–116)
ALT SERPL-CCNC: 57 U/L (ref 12–78)
ANION GAP SERPL CALC-SCNC: 12.2 MMOL/L (ref 5–14)
AST SERPL-CCNC: 17 U/L (ref 15–37)
BILIRUB DIRECT SERPL-MCNC: 0.26 MG/DL (ref 0–0.2)
BILIRUB INDIRECT SERPL-MCNC: 0.44 MG/DL
BILIRUB SERPL-MCNC: 0.7 MG/DL (ref 0.2–1)
BUN SERPL-MCNC: 29 MG/DL (ref 7–18)
CALCIUM SERPL-MCNC: 7.9 MG/DL (ref 8.5–10.1)
CHLORIDE SERPL-SCNC: 110 MMOL/L (ref 100–108)
CO2 SERPL-SCNC: 24 MMOL/L (ref 21–32)
CREAT CL 24H UR+SERPL-VRATE: 43.21 ML/MIN
CREAT SERPL-MCNC: 1.5 MG/DL (ref 0.8–1.3)
GLOBULIN SER-MCNC: 2.3 G/DL (ref 2.3–3.5)
GLUCOSE SERPL-MCNC: 166 MG/DL (ref 74–106)
HCT VFR BLD AUTO: 31.9 % (ref 38.4–49.7)
HGB BLD-MCNC: 10.5 G/DL (ref 12.9–16.9)
MCH RBC QN AUTO: 26.7 PG (ref 31.6–35.5)
MCHC RBC AUTO-ENTMCNC: 32.9 G/DL (ref 31.6–35.5)
MCHC RBC AUTO-ENTMCNC: 81.2 FL (ref 81.4–99)
PHOSPHATE SERPL-MCNC: 2.3 MG/DL (ref 2.5–4.9)
PLATELET # BLD AUTO: 119 K/UL (ref 130–375)
POTASSIUM SERPL-SCNC: 3.2 MMOL/L (ref 3.6–5.2)
PROT SERPL-MCNC: 4.6 G/DL (ref 6.4–8.2)
RBC # BLD AUTO: 3.93 M/UL (ref 4.14–5.76)
SODIUM SERPL-SCNC: 143 MMOL/L (ref 140–148)
WBC # BLD AUTO: 9.6 K/UL (ref 3.2–11)

## 2024-10-12 RX ADMIN — POTASSIUM CHLORIDE ONE MEQ: 1500 TABLET, EXTENDED RELEASE ORAL at 21:08

## 2024-10-12 RX ADMIN — Medication SCH: at 08:56

## 2024-10-13 VITALS — HEART RATE: 80 BPM | SYSTOLIC BLOOD PRESSURE: 126 MMHG | DIASTOLIC BLOOD PRESSURE: 73 MMHG

## 2024-10-13 RX ADMIN — SCOPALAMINE SCH MG: 1 PATCH, EXTENDED RELEASE TRANSDERMAL at 10:04

## 2024-10-20 ENCOUNTER — HOSPITAL ENCOUNTER (EMERGENCY)
Dept: HOSPITAL 11 - JP.ED | Age: 64
Discharge: HOME | End: 2024-10-20
Payer: COMMERCIAL

## 2024-10-20 VITALS — HEART RATE: 76 BPM | DIASTOLIC BLOOD PRESSURE: 73 MMHG | SYSTOLIC BLOOD PRESSURE: 131 MMHG

## 2024-10-20 DIAGNOSIS — Z79.899: ICD-10-CM

## 2024-10-20 DIAGNOSIS — I25.10: ICD-10-CM

## 2024-10-20 DIAGNOSIS — Z91.048: ICD-10-CM

## 2024-10-20 DIAGNOSIS — I25.2: ICD-10-CM

## 2024-10-20 DIAGNOSIS — Z88.8: ICD-10-CM

## 2024-10-20 DIAGNOSIS — N18.9: ICD-10-CM

## 2024-10-20 DIAGNOSIS — Z95.5: ICD-10-CM

## 2024-10-20 DIAGNOSIS — Z90.49: ICD-10-CM

## 2024-10-20 DIAGNOSIS — C25.0: ICD-10-CM

## 2024-10-20 DIAGNOSIS — R60.0: Primary | ICD-10-CM

## 2024-10-20 DIAGNOSIS — Z88.5: ICD-10-CM

## 2024-10-20 DIAGNOSIS — Z79.84: ICD-10-CM

## 2024-10-20 DIAGNOSIS — E11.22: ICD-10-CM

## 2024-10-20 DIAGNOSIS — Z79.82: ICD-10-CM

## 2024-10-20 DIAGNOSIS — Z95.1: ICD-10-CM

## 2024-10-20 DIAGNOSIS — I12.9: ICD-10-CM

## 2024-10-20 DIAGNOSIS — Z88.2: ICD-10-CM

## 2024-10-20 DIAGNOSIS — E87.6: ICD-10-CM

## 2024-10-20 LAB
ALBUMIN SERPL-MCNC: 2 G/DL (ref 3.4–5)
ALBUMIN/GLOB SERPL: 0.7 {RATIO} (ref 1.2–2.2)
ALP SERPL-CCNC: 467 U/L (ref 46–116)
ALT SERPL-CCNC: 164 U/L (ref 12–78)
ANION GAP SERPL CALC-SCNC: 10.8 MMOL/L (ref 5–14)
AST SERPL-CCNC: 61 U/L (ref 15–37)
BILIRUB SERPL-MCNC: 1 MG/DL (ref 0.2–1)
BUN SERPL-MCNC: 21 MG/DL (ref 7–18)
CALCIUM SERPL-MCNC: 8 MG/DL (ref 8.5–10.1)
CHLORIDE SERPL-SCNC: 108 MMOL/L (ref 100–108)
CO2 SERPL-SCNC: 28 MMOL/L (ref 21–32)
CREAT CL 24H UR+SERPL-VRATE: 49.94 ML/MIN
CREAT SERPL-MCNC: 1.3 MG/DL (ref 0.8–1.3)
GLOBULIN SER-MCNC: 3.1 G/DL (ref 2.3–3.5)
GLUCOSE SERPL-MCNC: 152 MG/DL (ref 74–106)
HCT VFR BLD AUTO: 32.7 % (ref 38.4–49.7)
HGB BLD-MCNC: 10.7 G/DL (ref 12.9–16.9)
LYMPHOCYTES # BLD: 1.02 K/UL (ref 0.8–3.3)
LYMPHOCYTES NFR BLD MANUAL: 5 % (ref 24–44)
MCH RBC QN AUTO: 27 PG (ref 31.6–35.5)
MCHC RBC AUTO-ENTMCNC: 32.7 G/DL (ref 31.6–35.5)
MCHC RBC AUTO-ENTMCNC: 82.4 FL (ref 81.4–99)
MONOCYTES # BLD MANUAL: 1.63 K/UL (ref 0.2–0.9)
MONOCYTES NFR BLD MANUAL: 8 % (ref 2–6)
NEUTROPHILS # BLD: 16.73 K/UL (ref 1–7.6)
NEUTROPHILS NFR BLD MANUAL: 82 % (ref 36–66)
NEUTS BAND # BLD MANUAL: 1.02 K/UL
NEUTS BAND NFR BLD MANUAL: 5 % (ref 5–11)
PLATELET # BLD AUTO: 313 K/UL (ref 130–375)
POTASSIUM SERPL-SCNC: 2.8 MMOL/L (ref 3.6–5.2)
PROT SERPL-MCNC: 5.1 G/DL (ref 6.4–8.2)
RBC # BLD AUTO: 3.97 M/UL (ref 4.14–5.76)
SODIUM SERPL-SCNC: 144 MMOL/L (ref 140–148)
WBC # BLD AUTO: 20.4 K/UL (ref 3.2–11)

## 2024-10-20 PROCEDURE — 85025 COMPLETE CBC W/AUTO DIFF WBC: CPT

## 2024-10-20 PROCEDURE — 80053 COMPREHEN METABOLIC PANEL: CPT

## 2024-10-20 PROCEDURE — 96365 THER/PROPH/DIAG IV INF INIT: CPT

## 2024-10-20 PROCEDURE — 36415 COLL VENOUS BLD VENIPUNCTURE: CPT

## 2024-10-20 PROCEDURE — 83605 ASSAY OF LACTIC ACID: CPT

## 2024-10-20 PROCEDURE — 99284 EMERGENCY DEPT VISIT MOD MDM: CPT

## 2024-10-20 PROCEDURE — 96366 THER/PROPH/DIAG IV INF ADDON: CPT

## 2024-10-20 RX ADMIN — LIDOCAINE HYDROCHLORIDE ONE MLS/HR: 10 INJECTION, SOLUTION INFILTRATION; PERINEURAL at 12:54

## 2024-10-20 RX ADMIN — POTASSIUM CHLORIDE ONE MEQ: 1500 TABLET, EXTENDED RELEASE ORAL at 12:54

## 2024-11-08 ENCOUNTER — HOSPITAL ENCOUNTER (INPATIENT)
Dept: HOSPITAL 11 - JP.ED | Age: 64
LOS: 5 days | Discharge: HOME | DRG: 872 | End: 2024-11-13
Attending: FAMILY MEDICINE | Admitting: FAMILY MEDICINE
Payer: COMMERCIAL

## 2024-11-08 DIAGNOSIS — A41.02: Primary | ICD-10-CM

## 2024-11-08 DIAGNOSIS — E78.00: ICD-10-CM

## 2024-11-08 DIAGNOSIS — I25.10: ICD-10-CM

## 2024-11-08 DIAGNOSIS — Z66: ICD-10-CM

## 2024-11-08 DIAGNOSIS — A41.81: ICD-10-CM

## 2024-11-08 DIAGNOSIS — I50.9: ICD-10-CM

## 2024-11-08 DIAGNOSIS — Z79.899: ICD-10-CM

## 2024-11-08 DIAGNOSIS — Z87.891: ICD-10-CM

## 2024-11-08 DIAGNOSIS — I13.0: ICD-10-CM

## 2024-11-08 DIAGNOSIS — R04.0: ICD-10-CM

## 2024-11-08 DIAGNOSIS — Z95.5: ICD-10-CM

## 2024-11-08 DIAGNOSIS — N18.9: ICD-10-CM

## 2024-11-08 DIAGNOSIS — I25.2: ICD-10-CM

## 2024-11-08 DIAGNOSIS — Z90.49: ICD-10-CM

## 2024-11-08 DIAGNOSIS — E87.6: ICD-10-CM

## 2024-11-08 DIAGNOSIS — Z79.02: ICD-10-CM

## 2024-11-08 DIAGNOSIS — L03.115: ICD-10-CM

## 2024-11-08 DIAGNOSIS — E11.22: ICD-10-CM

## 2024-11-08 DIAGNOSIS — A41.89: ICD-10-CM

## 2024-11-08 DIAGNOSIS — L03.116: ICD-10-CM

## 2024-11-08 DIAGNOSIS — C25.9: ICD-10-CM

## 2024-11-08 DIAGNOSIS — Z96.659: ICD-10-CM

## 2024-11-08 DIAGNOSIS — E83.42: ICD-10-CM

## 2024-11-08 DIAGNOSIS — Z88.2: ICD-10-CM

## 2024-11-08 DIAGNOSIS — Z88.8: ICD-10-CM

## 2024-11-08 DIAGNOSIS — G47.00: ICD-10-CM

## 2024-11-08 DIAGNOSIS — Z88.5: ICD-10-CM

## 2024-11-08 DIAGNOSIS — Z79.82: ICD-10-CM

## 2024-11-08 DIAGNOSIS — Z98.890: ICD-10-CM

## 2024-11-08 DIAGNOSIS — Z98.52: ICD-10-CM

## 2024-11-08 LAB
ANION GAP SERPL CALC-SCNC: 7.1 MMOL/L (ref 5–14)
BASOPHILS # BLD AUTO: 0.06 K/UL (ref 0–0.1)
BASOPHILS NFR BLD AUTO: 0.3 % (ref 0.1–1.3)
BUN SERPL-MCNC: 32 MG/DL (ref 7–18)
CALCIUM SERPL-MCNC: 8.2 MG/DL (ref 8.5–10.1)
CHLORIDE SERPL-SCNC: 106 MMOL/L (ref 100–108)
CO2 SERPL-SCNC: 28 MMOL/L (ref 21–32)
CREAT CL 24H UR+SERPL-VRATE: 40.57 ML/MIN
CREAT SERPL-MCNC: 1.6 MG/DL (ref 0.8–1.3)
CRP SERPL-MCNC: 2.28 MG/DL (ref ?–0.5)
EOSINOPHIL # BLD AUTO: 0.03 K/UL (ref 0–0.4)
EOSINOPHIL NFR BLD AUTO: 0.2 % (ref 0–5.4)
GLUCOSE SERPL-MCNC: 240 MG/DL (ref 74–106)
HCT VFR BLD AUTO: 32.3 % (ref 38.4–49.7)
HGB BLD-MCNC: 10.3 G/DL (ref 12.9–16.9)
IMM GRANULOCYTES # BLD: 0.45 K/UL (ref 0–0.23)
IMM GRANULOCYTES NFR BLD: 2.5 % (ref 0–0.7)
LACTATE SERPL-SCNC: 1.7 MMOL/L (ref 0.4–2)
LYMPHOCYTES # BLD AUTO: 0.95 K/UL (ref 0.8–3.3)
LYMPHOCYTES NFR BLD AUTO: 5.2 % (ref 11.4–47.7)
MAGNESIUM SERPL-MCNC: 1.5 MG/DL (ref 1.8–2.4)
MCH RBC QN AUTO: 26.8 PG (ref 31.6–35.5)
MCHC RBC AUTO-ENTMCNC: 31.9 G/DL (ref 31.6–35.5)
MCHC RBC AUTO-ENTMCNC: 84.1 FL (ref 81.4–99)
MONOCYTES # BLD AUTO: 1.16 K/UL (ref 0.2–0.9)
MONOCYTES NFR BLD AUTO: 6.4 % (ref 3.3–12.6)
NEUTROPHILS # BLD AUTO: 15.57 K/UL (ref 1–7.6)
NEUTROPHILS NFR BLD AUTO: 85.4 % (ref 40–78.1)
PLATELET # BLD AUTO: 288 K/UL (ref 130–375)
POTASSIUM SERPL-SCNC: 3.7 MMOL/L (ref 3.6–5.2)
RBC # BLD AUTO: 3.84 M/UL (ref 4.14–5.76)
SODIUM SERPL-SCNC: 141 MMOL/L (ref 140–148)
WBC # BLD AUTO: 18.2 K/UL (ref 3.2–11)

## 2024-11-08 RX ADMIN — Medication PRN ML: at 13:34

## 2024-11-08 RX ADMIN — MAGNESIUM SULFATE IN WATER ONE MLS/HR: 40 INJECTION, SOLUTION INTRAVENOUS at 14:10

## 2024-11-09 LAB
ALBUMIN SERPL-MCNC: 2 G/DL (ref 3.4–5)
ALBUMIN/GLOB SERPL: 0.7 {RATIO} (ref 1.2–2.2)
ALP SERPL-CCNC: 500 U/L (ref 46–116)
ALT SERPL-CCNC: 98 U/L (ref 12–78)
ANION GAP SERPL CALC-SCNC: 12.1 MMOL/L (ref 5–14)
AST SERPL-CCNC: 26 U/L (ref 15–37)
BILIRUB SERPL-MCNC: 0.5 MG/DL (ref 0.2–1)
BUN SERPL-MCNC: 24 MG/DL (ref 7–18)
CALCIUM SERPL-MCNC: 7.8 MG/DL (ref 8.5–10.1)
CHLORIDE SERPL-SCNC: 108 MMOL/L (ref 100–108)
CO2 SERPL-SCNC: 26 MMOL/L (ref 21–32)
CREAT CL 24H UR+SERPL-VRATE: 49.94 ML/MIN
CREAT SERPL-MCNC: 1.3 MG/DL (ref 0.8–1.3)
CRP SERPL-MCNC: 1.69 MG/DL (ref ?–0.5)
GLOBULIN SER-MCNC: 2.8 G/DL (ref 2.3–3.5)
GLUCOSE SERPL-MCNC: 145 MG/DL (ref 74–106)
HBA1C BLD-MCNC: 8.8 % (ref 4.5–6.2)
HCT VFR BLD AUTO: 29.9 % (ref 38.4–49.7)
HGB BLD-MCNC: 9.5 G/DL (ref 12.9–16.9)
MAGNESIUM SERPL-MCNC: 1.7 MG/DL (ref 1.8–2.4)
MCH RBC QN AUTO: 26.6 PG (ref 31.6–35.5)
MCHC RBC AUTO-ENTMCNC: 31.8 G/DL (ref 31.6–35.5)
MCHC RBC AUTO-ENTMCNC: 83.8 FL (ref 81.4–99)
PLATELET # BLD AUTO: 274 K/UL (ref 130–375)
POTASSIUM SERPL-SCNC: 3.1 MMOL/L (ref 3.6–5.2)
PROT SERPL-MCNC: 4.8 G/DL (ref 6.4–8.2)
RBC # BLD AUTO: 3.57 M/UL (ref 4.14–5.76)
SODIUM SERPL-SCNC: 143 MMOL/L (ref 140–148)
WBC # BLD AUTO: 18.2 K/UL (ref 3.2–11)

## 2024-11-09 RX ADMIN — TAZOBACTAM SODIUM AND PIPERACILLIN SODIUM SCH MLS/HR: 500; 4 INJECTION, SOLUTION INTRAVENOUS at 17:48

## 2024-11-09 RX ADMIN — BUMETANIDE SCH MG: 0.25 INJECTION INTRAMUSCULAR; INTRAVENOUS at 09:17

## 2024-11-09 RX ADMIN — MAGNESIUM SULFATE IN WATER SCH MLS/HR: 40 INJECTION, SOLUTION INTRAVENOUS at 15:41

## 2024-11-09 RX ADMIN — Medication SCH CAP: at 15:41

## 2024-11-09 RX ADMIN — POTASSIUM CHLORIDE SCH MEQ: 1500 TABLET, EXTENDED RELEASE ORAL at 09:21

## 2024-11-09 RX ADMIN — POTASSIUM CHLORIDE ONE MEQ: 1500 TABLET, EXTENDED RELEASE ORAL at 09:36

## 2024-11-09 RX ADMIN — TAZOBACTAM SODIUM AND PIPERACILLIN SODIUM ONE MLS/HR: 500; 4 INJECTION, SOLUTION INTRAVENOUS at 13:14

## 2024-11-09 RX ADMIN — INSULIN LISPRO SCH UNITS: 100 INJECTION, SOLUTION INTRAVENOUS; SUBCUTANEOUS at 11:57

## 2024-11-10 LAB
ALBUMIN SERPL-MCNC: 1.8 G/DL (ref 3.4–5)
ALBUMIN/GLOB SERPL: 0.6 {RATIO} (ref 1.2–2.2)
ALP SERPL-CCNC: 410 U/L (ref 46–116)
ALT SERPL-CCNC: 72 U/L (ref 12–78)
ANION GAP SERPL CALC-SCNC: 9.2 MMOL/L (ref 5–14)
AST SERPL-CCNC: 20 U/L (ref 15–37)
BILIRUB SERPL-MCNC: 0.6 MG/DL (ref 0.2–1)
BUN SERPL-MCNC: 20 MG/DL (ref 7–18)
CALCIUM SERPL-MCNC: 8.1 MG/DL (ref 8.5–10.1)
CHLORIDE SERPL-SCNC: 110 MMOL/L (ref 100–108)
CO2 SERPL-SCNC: 28 MMOL/L (ref 21–32)
CREAT CL 24H UR+SERPL-VRATE: 49.94 ML/MIN
CREAT SERPL-MCNC: 1.3 MG/DL (ref 0.8–1.3)
GLOBULIN SER-MCNC: 2.9 G/DL (ref 2.3–3.5)
GLUCOSE SERPL-MCNC: 138 MG/DL (ref 74–106)
HCT VFR BLD AUTO: 29.3 % (ref 38.4–49.7)
HGB BLD-MCNC: 9.5 G/DL (ref 12.9–16.9)
MAGNESIUM SERPL-MCNC: 1.7 MG/DL (ref 1.8–2.4)
MCH RBC QN AUTO: 26.9 PG (ref 31.6–35.5)
MCHC RBC AUTO-ENTMCNC: 32.4 G/DL (ref 31.6–35.5)
MCHC RBC AUTO-ENTMCNC: 83 FL (ref 81.4–99)
PLATELET # BLD AUTO: 261 K/UL (ref 130–375)
POTASSIUM SERPL-SCNC: 3.2 MMOL/L (ref 3.6–5.2)
PROT SERPL-MCNC: 4.7 G/DL (ref 6.4–8.2)
RBC # BLD AUTO: 3.53 M/UL (ref 4.14–5.76)
SODIUM SERPL-SCNC: 144 MMOL/L (ref 140–148)
WBC # BLD AUTO: 15.3 K/UL (ref 3.2–11)

## 2024-11-10 RX ADMIN — POTASSIUM CHLORIDE ONE MEQ: 1500 TABLET, EXTENDED RELEASE ORAL at 17:09

## 2024-11-10 RX ADMIN — MAGNESIUM SULFATE IN WATER SCH MLS/HR: 40 INJECTION, SOLUTION INTRAVENOUS at 08:54

## 2024-11-10 RX ADMIN — POTASSIUM CHLORIDE ONE MEQ: 1500 TABLET, EXTENDED RELEASE ORAL at 10:10

## 2024-11-11 LAB
ANION GAP SERPL CALC-SCNC: 3.9 MMOL/L (ref 5–14)
BUN SERPL-MCNC: 20 MG/DL (ref 7–18)
CALCIUM SERPL-MCNC: 8 MG/DL (ref 8.5–10.1)
CHLORIDE SERPL-SCNC: 108 MMOL/L (ref 100–108)
CO2 SERPL-SCNC: 28 MMOL/L (ref 21–32)
CREAT CL 24H UR+SERPL-VRATE: 49.94 ML/MIN
CREAT SERPL-MCNC: 1.3 MG/DL (ref 0.8–1.3)
GLUCOSE SERPL-MCNC: 142 MG/DL (ref 74–106)
HCT VFR BLD AUTO: 29.4 % (ref 38.4–49.7)
HGB BLD-MCNC: 9.6 G/DL (ref 12.9–16.9)
MAGNESIUM SERPL-MCNC: 1.7 MG/DL (ref 1.8–2.4)
MCH RBC QN AUTO: 27.1 PG (ref 31.6–35.5)
MCHC RBC AUTO-ENTMCNC: 32.7 G/DL (ref 31.6–35.5)
MCHC RBC AUTO-ENTMCNC: 83.1 FL (ref 81.4–99)
PLATELET # BLD AUTO: 304 K/UL (ref 130–375)
POTASSIUM SERPL-SCNC: 4.3 MMOL/L (ref 3.6–5.2)
RBC # BLD AUTO: 3.54 M/UL (ref 4.14–5.76)
SODIUM SERPL-SCNC: 140 MMOL/L (ref 140–148)
VANCOMYCIN SERPL-MCNC: 16.2 UG/ML (ref 0–50)
WBC # BLD AUTO: 18.1 K/UL (ref 3.2–11)

## 2024-11-11 RX ADMIN — SODIUM CHLORIDE SCH MLS/HR: 9 INJECTION, SOLUTION INTRAVENOUS at 15:26

## 2024-11-12 LAB
ALBUMIN SERPL-MCNC: 1.8 G/DL (ref 3.4–5)
ALBUMIN/GLOB SERPL: 0.6 {RATIO} (ref 1.2–2.2)
ALP SERPL-CCNC: 339 U/L (ref 46–116)
ALT SERPL-CCNC: 60 U/L (ref 12–78)
ANION GAP SERPL CALC-SCNC: 5.2 MMOL/L (ref 5–14)
AST SERPL-CCNC: 26 U/L (ref 15–37)
BILIRUB SERPL-MCNC: 0.4 MG/DL (ref 0.2–1)
BUN SERPL-MCNC: 25 MG/DL (ref 7–18)
CALCIUM SERPL-MCNC: 8.2 MG/DL (ref 8.5–10.1)
CHLORIDE SERPL-SCNC: 108 MMOL/L (ref 100–108)
CO2 SERPL-SCNC: 30 MMOL/L (ref 21–32)
CREAT CL 24H UR+SERPL-VRATE: 54.02 ML/MIN
CREAT SERPL-MCNC: 1.2 MG/DL (ref 0.8–1.3)
CRP SERPL-MCNC: 1.14 MG/DL (ref ?–0.5)
GLOBULIN SER-MCNC: 3.1 G/DL (ref 2.3–3.5)
GLUCOSE SERPL-MCNC: 187 MG/DL (ref 74–106)
HCT VFR BLD AUTO: 28.3 % (ref 38.4–49.7)
HGB BLD-MCNC: 9.1 G/DL (ref 12.9–16.9)
MCH RBC QN AUTO: 26.8 PG (ref 31.6–35.5)
MCHC RBC AUTO-ENTMCNC: 32.2 G/DL (ref 31.6–35.5)
MCHC RBC AUTO-ENTMCNC: 83.5 FL (ref 81.4–99)
PLATELET # BLD AUTO: 274 K/UL (ref 130–375)
POTASSIUM SERPL-SCNC: 4.3 MMOL/L (ref 3.6–5.2)
PROT SERPL-MCNC: 4.9 G/DL (ref 6.4–8.2)
RBC # BLD AUTO: 3.39 M/UL (ref 4.14–5.76)
SODIUM SERPL-SCNC: 143 MMOL/L (ref 140–148)
WBC # BLD AUTO: 16.7 K/UL (ref 3.2–11)

## 2024-11-12 RX ADMIN — INSULIN LISPRO SCH UNITS: 100 INJECTION, SOLUTION INTRAVENOUS; SUBCUTANEOUS at 21:32

## 2024-11-13 VITALS — SYSTOLIC BLOOD PRESSURE: 123 MMHG | HEART RATE: 79 BPM | DIASTOLIC BLOOD PRESSURE: 60 MMHG

## 2024-11-13 LAB
ALBUMIN SERPL-MCNC: 1.6 G/DL (ref 3.4–5)
ALBUMIN/GLOB SERPL: 0.5 {RATIO} (ref 1.2–2.2)
ALP SERPL-CCNC: 294 U/L (ref 46–116)
ALT SERPL-CCNC: 51 U/L (ref 12–78)
ANION GAP SERPL CALC-SCNC: 8.4 MMOL/L (ref 5–14)
AST SERPL-CCNC: 24 U/L (ref 15–37)
BASOPHILS # BLD AUTO: 0.1 K/UL (ref 0–0.1)
BASOPHILS NFR BLD AUTO: 0.7 % (ref 0.1–1.3)
BILIRUB SERPL-MCNC: 0.4 MG/DL (ref 0.2–1)
BUN SERPL-MCNC: 25 MG/DL (ref 7–18)
CALCIUM SERPL-MCNC: 8.2 MG/DL (ref 8.5–10.1)
CHLORIDE SERPL-SCNC: 106 MMOL/L (ref 100–108)
CO2 SERPL-SCNC: 28 MMOL/L (ref 21–32)
CREAT CL 24H UR+SERPL-VRATE: 58.93 ML/MIN
CREAT SERPL-MCNC: 1.1 MG/DL (ref 0.8–1.3)
CRP SERPL-MCNC: 1.65 MG/DL (ref ?–0.5)
EOSINOPHIL # BLD AUTO: 0.05 K/UL (ref 0–0.4)
EOSINOPHIL NFR BLD AUTO: 0.3 % (ref 0–5.4)
GLOBULIN SER-MCNC: 3.3 G/DL (ref 2.3–3.5)
GLUCOSE SERPL-MCNC: 179 MG/DL (ref 74–106)
HCT VFR BLD AUTO: 25.9 % (ref 38.4–49.7)
HGB BLD-MCNC: 8.5 G/DL (ref 12.9–16.9)
IMM GRANULOCYTES # BLD: 0.61 K/UL (ref 0–0.23)
IMM GRANULOCYTES NFR BLD: 4.1 % (ref 0–0.7)
LYMPHOCYTES # BLD AUTO: 1.3 K/UL (ref 0.8–3.3)
LYMPHOCYTES NFR BLD AUTO: 8.6 % (ref 11.4–47.7)
MCH RBC QN AUTO: 27.4 PG (ref 31.6–35.5)
MCHC RBC AUTO-ENTMCNC: 32.8 G/DL (ref 31.6–35.5)
MCHC RBC AUTO-ENTMCNC: 83.5 FL (ref 81.4–99)
MONOCYTES # BLD AUTO: 1.28 K/UL (ref 0.2–0.9)
MONOCYTES NFR BLD AUTO: 8.5 % (ref 3.3–12.6)
NEUTROPHILS # BLD AUTO: 11.71 K/UL (ref 1–7.6)
NEUTROPHILS NFR BLD AUTO: 77.8 % (ref 40–78.1)
PLATELET # BLD AUTO: 265 K/UL (ref 130–375)
POTASSIUM SERPL-SCNC: 3.9 MMOL/L (ref 3.6–5.2)
PROT SERPL-MCNC: 4.9 G/DL (ref 6.4–8.2)
RBC # BLD AUTO: 3.1 M/UL (ref 4.14–5.76)
SODIUM SERPL-SCNC: 142 MMOL/L (ref 140–148)
WBC # BLD AUTO: 15.1 K/UL (ref 3.2–11)